# Patient Record
Sex: MALE | Race: WHITE | NOT HISPANIC OR LATINO | Employment: STUDENT | URBAN - NONMETROPOLITAN AREA
[De-identification: names, ages, dates, MRNs, and addresses within clinical notes are randomized per-mention and may not be internally consistent; named-entity substitution may affect disease eponyms.]

---
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
## 2020-02-19 ENCOUNTER — TRANSCRIBE ORDERS (OUTPATIENT)
Dept: LAB | Facility: HOSPITAL | Age: 20
End: 2020-02-19

## 2020-02-19 ENCOUNTER — LAB (OUTPATIENT)
Dept: LAB | Facility: HOSPITAL | Age: 20
End: 2020-02-19

## 2020-02-19 DIAGNOSIS — R56.9 SEIZURE (HCC): Primary | ICD-10-CM

## 2020-02-19 DIAGNOSIS — R56.9 SEIZURE (HCC): ICD-10-CM

## 2020-02-19 PROCEDURE — 80183 DRUG SCRN QUANT OXCARBAZEPIN: CPT | Performed by: PSYCHIATRY & NEUROLOGY

## 2020-02-24 LAB — OXCARBAZEPINE: 21 UG/ML (ref 10–35)

## 2020-03-18 RX ORDER — OXCARBAZEPINE 300 MG/1
450 TABLET, FILM COATED ORAL 2 TIMES DAILY
Qty: 270 TABLET | Refills: 1 | Status: SHIPPED | OUTPATIENT
Start: 2020-03-18 | End: 2022-04-20

## 2020-04-03 ENCOUNTER — OFFICE VISIT (OUTPATIENT)
Dept: FAMILY MEDICINE CLINIC | Facility: CLINIC | Age: 20
End: 2020-04-03

## 2020-04-03 VITALS
BODY MASS INDEX: 20.64 KG/M2 | TEMPERATURE: 97.4 F | WEIGHT: 147.4 LBS | DIASTOLIC BLOOD PRESSURE: 60 MMHG | OXYGEN SATURATION: 98 % | SYSTOLIC BLOOD PRESSURE: 100 MMHG | HEIGHT: 71 IN | HEART RATE: 65 BPM

## 2020-04-03 DIAGNOSIS — S09.93XA FACIAL INJURY, INITIAL ENCOUNTER: Primary | ICD-10-CM

## 2020-04-03 PROCEDURE — 99213 OFFICE O/P EST LOW 20 MIN: CPT | Performed by: FAMILY MEDICINE

## 2020-04-03 NOTE — PROGRESS NOTES
" Subjective   Abundiolaurita Smith is a 19 y.o. male.     History of Present Illness     Fell.  He is not sure if lost consciousness.  His legs gave out.  He cant remember anything more.  He has history of seizure like activity.  Same date last year, seizure like activity and broke his nose.  He fell again on his face and has contusion to nose also abrasions on face including nose. Dr quevedo is his neurologist.     Review of Systems   Constitutional: Negative for chills, fatigue and fever.   HENT: Negative for congestion, ear discharge, ear pain, facial swelling, hearing loss, postnasal drip, rhinorrhea, sinus pressure, sore throat, trouble swallowing and voice change.    Eyes: Negative for discharge, redness and visual disturbance.   Respiratory: Negative for cough, chest tightness, shortness of breath and wheezing.    Cardiovascular: Negative for chest pain and palpitations.   Gastrointestinal: Negative for abdominal pain, blood in stool, constipation, diarrhea, nausea and vomiting.   Endocrine: Negative for polydipsia and polyuria.   Genitourinary: Negative for dysuria, flank pain, hematuria and urgency.   Musculoskeletal: Negative for arthralgias, back pain, joint swelling and myalgias.   Skin: Positive for wound. Negative for rash.   Neurological: Negative for dizziness, weakness, numbness and headaches.   Hematological: Negative for adenopathy.   Psychiatric/Behavioral: Negative for confusion and sleep disturbance. The patient is not nervous/anxious.            /60 (BP Location: Left arm, Patient Position: Sitting, Cuff Size: Adult)   Pulse 65   Temp 97.4 °F (36.3 °C) (Temporal)   Ht 180.3 cm (70.98\")   Wt 66.9 kg (147 lb 6.4 oz)   SpO2 98%   BMI 20.57 kg/m²       Objective     Physical Exam   Constitutional: He is oriented to person, place, and time. He appears well-developed and well-nourished.   HENT:   Head: Normocephalic.   Right Ear: External ear normal.   Left Ear: External ear normal.   Nose: " Nose normal.   Contusion and abrasion bridge of nose and forehead.    Eyes: Pupils are equal, round, and reactive to light. Conjunctivae and EOM are normal.   Neck: Normal range of motion.   Pulmonary/Chest: Effort normal.   Musculoskeletal: Normal range of motion.   Neurological: He is alert and oriented to person, place, and time.   No deficit noted   Skin:   Abrasion right anterior knee.    Psychiatric: He has a normal mood and affect. His behavior is normal. Judgment and thought content normal.   Nursing note and vitals reviewed.          PAST MEDICAL HISTORY     Past Medical History:   Diagnosis Date   • Acute otitis media    • Backache    • Bradycardia    • Common cold    • Cough    • Dyspnea    • Fever    • Headache    • Hip pain    • Joint pain of ankle and foot    • Memory impairment     apparent, not definitive      • S/P ventriculoperitoneal shunt    • Sprain of ankle    • Well child visit     immunization update         PAST SURGICAL HISTORY   No past surgical history on file.   SOCIAL HISTORY     Social History     Socioeconomic History   • Marital status: Single     Spouse name: Not on file   • Number of children: Not on file   • Years of education: Not on file   • Highest education level: Not on file   Tobacco Use   • Smoking status: Never Smoker   • Smokeless tobacco: Never Used   Substance and Sexual Activity   • Alcohol use: No   • Drug use: No   • Sexual activity: Defer      ALLERGIES   Patient has no known allergies.   MEDICATIONS     Current Outpatient Medications   Medication Sig Dispense Refill   • OXcarbazepine (TRILEPTAL) 300 MG tablet Take 1.5 tablets by mouth 2 (Two) Times a Day. 270 tablet 1   • promethazine (PHENERGAN) 25 MG tablet Take 1 tablet by mouth Every 6 (Six) Hours As Needed for Nausea or Vomiting. 30 tablet 0   • fluticasone (FLONASE) 50 MCG/ACT nasal spray 2 sprays into each nostril Daily. 1 bottle 11     No current facility-administered medications for this visit.         The  following portions of the patient's history were reviewed and updated as appropriate: allergies, current medications, past family history, past medical history, past social history, past surgical history and problem list.        Assessment/Plan   Abundio was seen today for facial injury.    Diagnoses and all orders for this visit:    Facial injury, initial encounter  -     XR Nasal Bones (In Office)      I don't see fracture.  May have had another seizure like episode.  He forgot his medicine Monday but I would not expect that to matter today.     We cleaned the blood off his face                  No follow-ups on file.                  This document has been electronically signed by Tiburcio Justin MD on April 3, 2020 15:18

## 2020-04-28 RX ORDER — CEPHALEXIN 250 MG/1
250 CAPSULE ORAL 4 TIMES DAILY
Qty: 28 CAPSULE | Refills: 0 | Status: SHIPPED | OUTPATIENT
Start: 2020-04-28 | End: 2022-04-20

## 2020-06-17 ENCOUNTER — LAB (OUTPATIENT)
Dept: LAB | Facility: HOSPITAL | Age: 20
End: 2020-06-17

## 2020-06-17 ENCOUNTER — TRANSCRIBE ORDERS (OUTPATIENT)
Dept: LAB | Facility: HOSPITAL | Age: 20
End: 2020-06-17

## 2020-06-17 DIAGNOSIS — R56.9 SEIZURES (HCC): ICD-10-CM

## 2020-06-17 DIAGNOSIS — Z79.899 ENCOUNTER FOR LONG-TERM (CURRENT) USE OF OTHER MEDICATIONS: Primary | ICD-10-CM

## 2020-06-17 LAB
ANION GAP SERPL CALCULATED.3IONS-SCNC: 10.3 MMOL/L (ref 5–15)
BUN BLD-MCNC: 11 MG/DL (ref 6–20)
BUN/CREAT SERPL: 14.1 (ref 7–25)
CALCIUM SPEC-SCNC: 9.4 MG/DL (ref 8.6–10.5)
CHLORIDE SERPL-SCNC: 105 MMOL/L (ref 98–107)
CO2 SERPL-SCNC: 26.7 MMOL/L (ref 22–29)
CREAT BLD-MCNC: 0.78 MG/DL (ref 0.76–1.27)
GFR SERPL CREATININE-BSD FRML MDRD: 127 ML/MIN/1.73
GLUCOSE BLD-MCNC: 93 MG/DL (ref 65–99)
POTASSIUM BLD-SCNC: 4.2 MMOL/L (ref 3.5–5.2)
SODIUM BLD-SCNC: 142 MMOL/L (ref 136–145)

## 2020-06-17 PROCEDURE — 80048 BASIC METABOLIC PNL TOTAL CA: CPT | Performed by: PSYCHIATRY & NEUROLOGY

## 2020-06-17 PROCEDURE — 80183 DRUG SCRN QUANT OXCARBAZEPIN: CPT | Performed by: PSYCHIATRY & NEUROLOGY

## 2020-06-19 LAB — OXCARBAZEPINE: 23 UG/ML (ref 10–35)

## 2022-04-09 DIAGNOSIS — R00.1 BRADYCARDIA: Primary | ICD-10-CM

## 2022-04-20 ENCOUNTER — OFFICE VISIT (OUTPATIENT)
Dept: CARDIOLOGY | Facility: CLINIC | Age: 22
End: 2022-04-20

## 2022-04-20 ENCOUNTER — CLINICAL SUPPORT (OUTPATIENT)
Dept: CARDIOLOGY | Facility: CLINIC | Age: 22
End: 2022-04-20

## 2022-04-20 VITALS
BODY MASS INDEX: 24.11 KG/M2 | HEART RATE: 64 BPM | SYSTOLIC BLOOD PRESSURE: 122 MMHG | OXYGEN SATURATION: 96 % | WEIGHT: 172.2 LBS | DIASTOLIC BLOOD PRESSURE: 84 MMHG | HEIGHT: 71 IN

## 2022-04-20 DIAGNOSIS — R00.1 BRADYCARDIA, SINUS: ICD-10-CM

## 2022-04-20 DIAGNOSIS — I45.5 SINUS PAUSE: ICD-10-CM

## 2022-04-20 DIAGNOSIS — R00.1 SLOW HEART RATE: ICD-10-CM

## 2022-04-20 DIAGNOSIS — R55 SYNCOPE AND COLLAPSE: Primary | ICD-10-CM

## 2022-04-20 PROCEDURE — 99204 OFFICE O/P NEW MOD 45 MIN: CPT | Performed by: NURSE PRACTITIONER

## 2022-04-20 PROCEDURE — 93000 ELECTROCARDIOGRAM COMPLETE: CPT | Performed by: INTERNAL MEDICINE

## 2022-04-20 PROCEDURE — 93010 ELECTROCARDIOGRAM REPORT: CPT | Performed by: INTERNAL MEDICINE

## 2022-04-20 PROCEDURE — 93285 PRGRMG DEV EVAL SCRMS IP: CPT | Performed by: NURSE PRACTITIONER

## 2022-04-20 RX ORDER — LACOSAMIDE 100 MG/1
TABLET ORAL
COMMUNITY
End: 2022-06-01

## 2022-04-20 NOTE — PROGRESS NOTES
"Slow Heart Rate (Chief Complaint )      History of Present Illness     Mr. Leonel Smith is a 22-year-old  male with complex medical history including chronic  shunt, seizures, frontal lobe syndrome, hydrocephalus with revision in 2016 disease (he follows with neurology, Dr. Kraus at [facility]), PDA closure and Pectus Excavatum \"sunken in chest\".  Patient presents today with mother who assist with his memory and history due to forgetfulness with frontal lobe syndrome. He has chronic known bradycardia. He previously was noted to follow with pediatric cardiologist in [facility]. Last seen there in Jan 2021.     He presents to our clinic today for consultation regarding bradycardia and recurrent syncope. Patient reports syncope x several weeks. He reports nystagmus and visual changes prior to syncope. He denies cardiac symptoms such as palpitations, shortness of breath, or other prodromal symptoms. Episodes have been witnessed.  Patient will awaken after a few seconds to minutes.  He has no recollection of what happened and feels very sluggish afterwards. Denies syncope being related to seizure like activity, convulsion, loss or bowel or bladder. He is on seizure medication Vimpat, however syncope was occurring prior to starting of this medication.     He most recently was evaluated by Dr. Kim.  Echocardiogram in his office was normal.  Tilt table was normal. Loop recorder was placed.  Patient has been noted to have episodes of sinus pauses per Dr. Kim's office note and he has noted possible permament pacemaker may be required.            Past Medical History:   Diagnosis Date   • Acute otitis media    • Backache    • Bradycardia    • Common cold    • Cough    • Dyspnea    • Fever    • Headache    • Hip pain    • Joint pain of ankle and foot    • Memory impairment     apparent, not definitive      • S/P ventriculoperitoneal shunt    • Sprain of ankle    • Well child visit     immunization update  "       History reviewed. No pertinent surgical history.  Social History     Socioeconomic History   • Marital status: Single   Tobacco Use   • Smoking status: Never Smoker   • Smokeless tobacco: Never Used   Substance and Sexual Activity   • Alcohol use: No   • Drug use: No   • Sexual activity: Defer     History reviewed. No pertinent family history.    ALLERGIES:  No Known Allergies      Review of Systems   Constitutional: Positive for malaise/fatigue. Negative for chills, fever and weight gain.   HENT: Negative for nosebleeds and tinnitus.    Eyes: Negative for blurred vision and double vision.   Cardiovascular: Positive for chest pain and syncope. Negative for dyspnea on exertion, irregular heartbeat, leg swelling and palpitations.   Respiratory: Negative for cough, shortness of breath, sleep disturbances due to breathing and snoring.    Endocrine: Negative for polydipsia, polyphagia and polyuria.   Hematologic/Lymphatic: Negative for bleeding problem. Does not bruise/bleed easily.   Skin: Negative for color change and suspicious lesions.   Musculoskeletal: Negative for falls and myalgias.   Gastrointestinal: Negative for bloating, heartburn and hematochezia.   Genitourinary: Negative for dysuria and hematuria.   Neurological: Positive for seizures. Negative for dizziness, headaches, vertigo and weakness.   Psychiatric/Behavioral: Negative for altered mental status and depression. The patient does not have insomnia and is not nervous/anxious.    Allergic/Immunologic: Negative for environmental allergies and persistent infections.       Current Outpatient Medications   Medication Sig Dispense Refill   • lacosamide (VIMPAT) 100 MG tablet tablet Take  by mouth.       No current facility-administered medications for this visit.       OBJECTIVE:    Physical Exam:   Vitals reviewed.   Constitutional:       General: Not in acute distress.     Appearance: Normal appearance. Well-developed. Not toxic-appearing or  "diaphoretic.   Eyes:      General: Lids are normal.      Conjunctiva/sclera: Conjunctivae normal.   HENT:      Head: Normocephalic and atraumatic.      Right Ear: External ear normal.      Left Ear: External ear normal.   Neck:      Vascular: No JVD.   Pulmonary:      Effort: Pulmonary effort is normal. No respiratory distress.      Breath sounds: Normal breath sounds. No decreased breath sounds. No wheezing. No rales.   Chest:      Chest wall: Not tender to palpatation.   Cardiovascular:      PMI at left midclavicular line. Normal rate. Regular rhythm. Normal S1 with normal intensity. Normal S2 with normal intensity.      Murmurs: There is no murmur.      No gallop. No S3 and S4 gallop. No click. No rub.   Pulses:     Intact distal pulses. No decreased pulses.   Edema:     Peripheral edema absent.   Abdominal:      General: Bowel sounds are normal. There is no distension.      Palpations: Abdomen is soft.      Tenderness: There is no abdominal tenderness.   Musculoskeletal:      Cervical back: Normal range of motion and neck supple. Skin:     General: Skin is warm and dry.      Coloration: Skin is not pale.      Findings: No erythema or rash.   Neurological:      Mental Status: Alert and oriented to person, place, and time.      Gait: Gait normal.   Psychiatric:         Behavior: Behavior normal.         Thought Content: Thought content normal.         Judgment: Judgment normal.       Vitals:    04/20/22 1520   BP: 122/84   BP Location: Right arm   Patient Position: Sitting   Cuff Size: Adult   Pulse: 64   SpO2: 96%   Weight: 78.1 kg (172 lb 3.2 oz)   Height: 180.3 cm (71\")       DATA REVIEWED:       No radiology results for the last 30 days.    Labs: BMP, CBC, LIPID, TSH  Lab Results   Component Value Date    GLUCOSE 93 06/17/2020    CALCIUM 10.3 10/06/2021     10/06/2021    K 4.3 10/06/2021    CO2 28 10/06/2021     10/06/2021    BUN 11 10/06/2021    CREATININE 0.83 10/06/2021    EGFRIFNONA 127 " 06/17/2020    BCR 14.1 06/17/2020    ANIONGAP 12 10/06/2021     Lab Results   Component Value Date    WBC 6.82 11/18/2019    HGB 17.1 11/18/2019    HCT 48.0 11/18/2019    MCV 87.1 11/18/2019     11/18/2019     No results found for: CHOL  No results found for: TRIG  No results found for: HDL  No components found for: LDLCALC  No results found for: LDL  No results found for: HDLLDLRATIO  No components found for: CHOLHDL  Lab Results   Component Value Date    TSH 2.300 08/05/2019     No results found for: PROBNP  EKG:         TTE:         The following portions of the patient's history were reviewed and updated as appropriate: allergies, current medications, past family history, past medical history, past social history, past surgical history and problem list.  Old records reviewed and pertinent information is included in the above objective data.     ASSESSMENT/PLAN:       Diagnosis Plan   1. Syncope and collapse  Adult Transthoracic Echo Complete w/ Color, Spectral and Contrast if Necessary Per Protocol   2. Sinus pause  Adult Transthoracic Echo Complete w/ Color, Spectral and Contrast if Necessary Per Protocol   3. Bradycardia, sinus  Adult Transthoracic Echo Complete w/ Color, Spectral and Contrast if Necessary Per Protocol       This is a 22-year-old  male as discussed above with complex medical history who previously followed with pediatric cardiology at [place] and most recently with Dr. Kim. Patient and mother were unhappy with their care and are transitioning to our office.  He has been noted to have recurrent syncope.  Concerns for cardiac etiology.  He does have a history of seizures, which are chronic. Syncope has developed prior to Vimpat. Vimpat does carry the potential side effect of cardiac conduction delay, however doubt this as a cause and syncope was occurring prior to medication.   -Echo at Dr. Kim's office unremarkable  -Tilt table at Dr. Kim's office  negative  -Patient ambulated in office today and HR did increase appropriately.   -EKG NSR at 64 bpm  -Loop recorder in place and interrogated showing: no AT/AF. Multiple bradycardia episodes ranging from 38-41 bpm. No heart block or pauses noted since last interrogation. We will repeat TTE at our office to assess for structural HD. Will consider Cardiac MRI. Case and loop recorder episodes to be discussed with Dr. Morales, electrophysiologist. No urgent or definitive indication for PPM at this time. Our office will watch for further events on loop recorder to be transmitted.     Follow up: 1 month    I spent 45 minutes caring for Abundio on this date of service. This time includes time spent by me in the following activities: preparing for the visit, reviewing tests, obtaining and/or reviewing a separately obtained history, performing a medically appropriate examination and/or evaluation, counseling and educating the patient/family/caregiver, ordering medications, tests, or procedures, referring and communicating with other health care professionals, documenting information in the medical record, independently interpreting results and communicating that information with the patient/family/caregiver and care coordination              This document has been electronically signed by JOANA Peñaloza on April 26, 2022 16:54 CDT

## 2022-04-21 LAB
QT INTERVAL: 370 MS
QTC INTERVAL: 381 MS

## 2022-04-21 NOTE — PROGRESS NOTES
Loop Recorder 30 day Event Summary, OFFICE     Indications:   Syncope         Loop recorder interrogation shows 0 episodes of atrial fibrillation     COUNTER since 4/8/22  Symptoms: 0  Tachy: 1  Pause: 1  Gerardo: 424  AT: 0  AF: 0  % of time in AF: 0     Observation Summary:  Noise ( % of day) 0    1. Syncope and collapse    2. Sinus pause    3. Bradycardia, sinus                  This document has been electronically signed by JOANA Peñaloza on April 26, 2022 16:59 CDT

## 2022-04-25 ENCOUNTER — TELEPHONE (OUTPATIENT)
Dept: CARDIOLOGY | Facility: CLINIC | Age: 22
End: 2022-04-25

## 2022-04-26 PROBLEM — R00.1 BRADYCARDIA, SINUS: Status: ACTIVE | Noted: 2022-04-26

## 2022-04-26 PROBLEM — R55 SYNCOPE AND COLLAPSE: Status: ACTIVE | Noted: 2022-04-26

## 2022-04-26 PROBLEM — I45.5 SINUS PAUSE: Status: ACTIVE | Noted: 2022-04-26

## 2022-05-04 ENCOUNTER — OFFICE VISIT (OUTPATIENT)
Dept: CARDIOLOGY | Facility: CLINIC | Age: 22
End: 2022-05-04

## 2022-05-04 VITALS
HEART RATE: 55 BPM | HEIGHT: 71 IN | TEMPERATURE: 97.1 F | BODY MASS INDEX: 24.78 KG/M2 | WEIGHT: 177 LBS | DIASTOLIC BLOOD PRESSURE: 70 MMHG | OXYGEN SATURATION: 98 % | SYSTOLIC BLOOD PRESSURE: 116 MMHG

## 2022-05-04 DIAGNOSIS — R00.1 BRADYCARDIA, SINUS: ICD-10-CM

## 2022-05-04 DIAGNOSIS — R55 SYNCOPE AND COLLAPSE: Primary | ICD-10-CM

## 2022-05-04 DIAGNOSIS — I45.5 SINUS PAUSE: ICD-10-CM

## 2022-05-04 PROCEDURE — 99214 OFFICE O/P EST MOD 30 MIN: CPT | Performed by: NURSE PRACTITIONER

## 2022-05-04 NOTE — PROGRESS NOTES
"syncope  (Chief Complaint )      History of Present Illness     Mr. Leonel Smith is a 22-year-old  male with complex medical history including chronic  shunt, seizures, frontal lobe syndrome, hydrocephalus with revision in 2016 disease (he follows with neurology, Dr. Kraus at [facility]), PDA closure and Pectus Excavatum \"sunken in chest\".  Patient presents today with mother who assist with his memory and history due to forgetfulness with frontal lobe syndrome. He has chronic known bradycardia. He previously was noted to follow with pediatric cardiologist in [facility]. Last seen there in Jan 2021.     He presented to our clinic today for consultation regarding bradycardia and recurrent syncope. Patient reports syncope x several months now. He reports nystagmus and visual changes prior to syncope. He denies cardiac symptoms such as palpitations, shortness of breath, or other prodromal symptoms. Episodes have been witnessed.  Patient will awaken after a few seconds to minutes.  He has no recollection of what happened and feels very sluggish afterwards. Denies syncope being related to known seizure like activity, convulsion, loss or bowel or bladder. He is on seizure medication Vimpat.    He most recently was evaluated by Dr. Kim.  Echocardiogram in his office was normal. Tilt table was normal. Loop recorder was placed.  Patient has been noted to have episodes of sinus pauses per Dr. Kim's office note and he has noted possible permament pacemaker may be required.     5/4/22: Patient presents as a follow up today. Echocardiogram was repeated in our office showing normal biventricular function and no evidence of structural HD. Loop recorder showing no further pauses. He does have bradycardia events which mostly occur around 0700-10am. Last syncopal episode in early March. He does report continued fatigue. The syncopal episodes are interfering with his life and making him unable to drive, work and " perform his normal activities.         Past Medical History:   Diagnosis Date   • Acute otitis media    • Backache    • Bradycardia    • Common cold    • Cough    • Dyspnea    • Fever    • Headache    • Hip pain    • Joint pain of ankle and foot    • Memory impairment     apparent, not definitive      • S/P ventriculoperitoneal shunt    • Sprain of ankle    • Well child visit     immunization update        History reviewed. No pertinent surgical history.  Social History     Socioeconomic History   • Marital status: Single   Tobacco Use   • Smoking status: Never Smoker   • Smokeless tobacco: Never Used   Substance and Sexual Activity   • Alcohol use: No   • Drug use: No   • Sexual activity: Defer     History reviewed. No pertinent family history.    ALLERGIES:  No Known Allergies      Review of Systems   Constitutional: Positive for malaise/fatigue. Negative for chills, fever and weight gain.   HENT: Negative for nosebleeds and tinnitus.    Eyes: Negative for blurred vision and double vision.   Cardiovascular: Positive for chest pain and syncope. Negative for dyspnea on exertion, irregular heartbeat, leg swelling and palpitations.   Respiratory: Negative for cough, shortness of breath, sleep disturbances due to breathing and snoring.    Endocrine: Negative for polydipsia, polyphagia and polyuria.   Hematologic/Lymphatic: Negative for bleeding problem. Does not bruise/bleed easily.   Skin: Negative for color change and suspicious lesions.   Musculoskeletal: Negative for falls and myalgias.   Gastrointestinal: Negative for bloating, heartburn and hematochezia.   Genitourinary: Negative for dysuria and hematuria.   Neurological: Positive for seizures. Negative for dizziness, headaches, vertigo and weakness.   Psychiatric/Behavioral: Negative for altered mental status and depression. The patient does not have insomnia and is not nervous/anxious.    Allergic/Immunologic: Negative for environmental allergies and persistent  infections.       Current Outpatient Medications   Medication Sig Dispense Refill   • lacosamide (VIMPAT) 100 MG tablet tablet Take  by mouth.       No current facility-administered medications for this visit.       OBJECTIVE:    Physical Exam:   Vitals reviewed.   Constitutional:       General: Not in acute distress.     Appearance: Normal appearance. Well-developed. Not toxic-appearing or diaphoretic.   Eyes:      General: Lids are normal.      Conjunctiva/sclera: Conjunctivae normal.   HENT:      Head: Normocephalic and atraumatic.      Right Ear: External ear normal.      Left Ear: External ear normal.   Neck:      Vascular: No JVD.   Pulmonary:      Effort: Pulmonary effort is normal. No respiratory distress.      Breath sounds: Normal breath sounds. No decreased breath sounds. No wheezing. No rales.   Chest:      Chest wall: Not tender to palpatation.   Cardiovascular:      PMI at left midclavicular line. Normal rate. Regular rhythm. Normal S1 with normal intensity. Normal S2 with normal intensity.      Murmurs: There is no murmur.      No gallop. No S3 and S4 gallop. No click. No rub.   Pulses:     Intact distal pulses. No decreased pulses.   Edema:     Peripheral edema absent.   Abdominal:      General: Bowel sounds are normal. There is no distension.      Palpations: Abdomen is soft.      Tenderness: There is no abdominal tenderness.   Musculoskeletal:      Cervical back: Normal range of motion and neck supple. Skin:     General: Skin is warm and dry.      Coloration: Skin is not pale.      Findings: No erythema or rash.   Neurological:      Mental Status: Alert and oriented to person, place, and time.      Gait: Gait normal.   Psychiatric:         Behavior: Behavior normal.         Thought Content: Thought content normal.         Judgment: Judgment normal.       Vitals:    05/04/22 1447   BP: 116/70   BP Location: Left arm   Patient Position: Sitting   Cuff Size: Adult   Pulse: 55   Temp: 97.1 °F (36.2 °C)  "  SpO2: 98%   Weight: 80.3 kg (177 lb)   Height: 180.3 cm (71\")       DATA REVIEWED:   Results for orders placed in visit on 04/27/22    Adult Transthoracic Echo Complete w/ Color, Spectral and Contrast if Necessary Per Protocol    Interpretation Summary  · Left ventricular diastolic function was normal.  · ENDOCARDIUM IS NOT WELL SEEN.      No radiology results for the last 30 days.    Labs: BMP, CBC, LIPID, TSH  Lab Results   Component Value Date    GLUCOSE 93 06/17/2020    CALCIUM 10.3 10/06/2021     10/06/2021    K 4.3 10/06/2021    CO2 28 10/06/2021     10/06/2021    BUN 11 10/06/2021    CREATININE 0.83 10/06/2021    EGFRIFNONA 127 06/17/2020    BCR 14.1 06/17/2020    ANIONGAP 12 10/06/2021     Lab Results   Component Value Date    WBC 6.82 11/18/2019    HGB 17.1 11/18/2019    HCT 48.0 11/18/2019    MCV 87.1 11/18/2019     11/18/2019     No results found for: CHOL  No results found for: TRIG  No results found for: HDL  No components found for: LDLCALC  No results found for: LDL  No results found for: HDLLDLRATIO  No components found for: CHOLHDL  Lab Results   Component Value Date    TSH 2.300 08/05/2019     No results found for: PROBNP  EKG:         TTE:     4/27/22:    Left Ventricle Calculated left ventricular EF = 56.2%   Normal left ventricular cavity size and wall thickness noted. All left ventricular wall segments contract normally. Left ventricular diastolic function was normal. ENDOCARDIUM IS NOT WELL SEEN   Right Ventricle Normal right ventricular cavity size, wall thickness, systolic function and septal motion noted.   Left Atrium Normal left atrial size and volume noted.   Right Atrium Normal right atrial cavity size noted.   Aortic Valve The aortic valve is structurally normal with no regurgitation or stenosis present.   Mitral Valve The mitral valve is structurally normal with no regurgitation or significant stenosis present.   Tricuspid Valve The tricuspid valve is structurally " normal with no significant stenosis present. Physiologic tricuspid valve regurgitation is present.   Pulmonic Valve The pulmonic valve is structurally normal with no regurgitation or significant stenosis present.   Greater Vessels No dilation of the aortic root is present.   Pericardium The pericardium is normal. There is no evidence of pericardial effusion. .             The following portions of the patient's history were reviewed and updated as appropriate: allergies, current medications, past family history, past medical history, past social history, past surgical history and problem list.  Old records reviewed and pertinent information is included in the above objective data.     ASSESSMENT/PLAN:    1. Syncope and collapse    2. Sinus pause    3. Bradycardia, sinus      This is a 22-year-old  male as discussed above with complex medical history who previously followed with pediatric cardiology at [place] and most recently with Dr. Kim. Patient and mother were unhappy with their care and are transitioning to our office.  He has been noted to have recurrent syncope.  Concerns for cardiac etiology.  He does have a history of seizures, which are chronic.  Vimpat does carry the potential side effect of cardiac conduction delay, brugada syndrome etc. For now we have advised that neurologist consider possibly changing seizure medication to see if this is medication is potentially contributing to his symptoms.     -Echo  unremarkable  -Tilt table at Dr. Kim's office negative  -Patient ambulated in office today and HR did increase appropriately.   -EKG NSR at 64 bpm  -Loop recorder in place and interrogated showing: no AT/AF. Multiple bradycardia episodes ranging from 38-41 bpm. No heart block or pauses noted since last interrogation.  Case and loop recorder episodes to be discussed with Dr. Morales, electrophysiologist. No urgent or definitive indication for PPM at this time. Our office will watch  for further events on loop recorder to be transmitted.     Follow up: Dr. Morales in 4-6 weeks. Appreciate his opinion and assistance with this very pleasant family.     I spent 30 minutes caring for Abundio on this date of service. This time includes time spent by me in the following activities: preparing for the visit, reviewing tests, obtaining and/or reviewing a separately obtained history, performing a medically appropriate examination and/or evaluation, counseling and educating the patient/family/caregiver, ordering medications, tests, or procedures, referring and communicating with other health care professionals, documenting information in the medical record, independently interpreting results and communicating that information with the patient/family/caregiver and care coordination                This document has been electronically signed by JOANA Peñaloza on May 5, 2022 12:45 CDT

## 2022-05-13 ENCOUNTER — TELEPHONE (OUTPATIENT)
Dept: CARDIOLOGY | Facility: CLINIC | Age: 22
End: 2022-05-13

## 2022-05-13 NOTE — TELEPHONE ENCOUNTER
"Patients Mom had called in stating Abundio had an episode of \" both hands tingling, eyes shaking, and feeling like he's going 100 miles per hour\". I told Mom I looked at the ICM and no remotes had came in today. Asked her to have Abundio send remote now so we can see it. Mom states she tells Abundio to send remote when he's having episodes but he has memory problems and forgets to send. Mom to send remote. Will continue to monitor.  "

## 2022-05-26 ENCOUNTER — TELEPHONE (OUTPATIENT)
Dept: CARDIOLOGY | Facility: CLINIC | Age: 22
End: 2022-05-26

## 2022-05-26 NOTE — TELEPHONE ENCOUNTER
Patients mother telephoned regarding ICM remotes. Told Mom patient hasn't had any alert to come in since 5/12/22, since I talked to her last. She did say his neurologist has made medications changes and that patient would be off the Vimpat completely in 2 days. Will continue to monitor.

## 2022-05-31 ENCOUNTER — TELEPHONE (OUTPATIENT)
Dept: CARDIOLOGY | Facility: CLINIC | Age: 22
End: 2022-05-31

## 2022-05-31 NOTE — TELEPHONE ENCOUNTER
Contacted patient mother and informed her that we can move his appointment up to 6/3/2022 @ 0900 to discuss surgical clearance.     She voiced her understanding and said that they would be there.  ----- Message from JOANA Peñaloza sent at 5/29/2022  2:50 PM CDT -----  Regarding: FW: Abundio Smith  Can Carmen take care of this, he has an upcoming appt with him. I see no reason he cannot proceed. Can we push up Akram appt and possibly call Leonel's mother?     Thanks I appreciate your help with this        ----- Message -----  From: Abundio Smith  Sent: 5/26/2022   1:26 PM CDT  To: JOANA Peñaloza  Subject: Abundio Duffyes                                      Who do I need to speak to for cardiac clearance, he is feeling really tired and bad every day and is consistently having bad headaches almost daily. I feel like this is his shunt and need to be replaced sooner than we had expected. Not sure why to do Neuro surgeon wanted to wait and see if he needed a pacemaker or what was gonna happen with all that.  But I feel like every day he is feeling worse.

## 2022-06-01 ENCOUNTER — OFFICE VISIT (OUTPATIENT)
Dept: CARDIOLOGY | Facility: CLINIC | Age: 22
End: 2022-06-01

## 2022-06-01 VITALS
WEIGHT: 173 LBS | SYSTOLIC BLOOD PRESSURE: 108 MMHG | BODY MASS INDEX: 23.43 KG/M2 | HEART RATE: 47 BPM | HEIGHT: 72 IN | DIASTOLIC BLOOD PRESSURE: 60 MMHG | OXYGEN SATURATION: 97 %

## 2022-06-01 DIAGNOSIS — R00.1 BRADYCARDIA, SINUS: ICD-10-CM

## 2022-06-01 DIAGNOSIS — R55 SYNCOPE AND COLLAPSE: ICD-10-CM

## 2022-06-01 DIAGNOSIS — I45.5 SINUS PAUSE: Primary | ICD-10-CM

## 2022-06-01 PROCEDURE — 99215 OFFICE O/P EST HI 40 MIN: CPT | Performed by: INTERNAL MEDICINE

## 2022-06-01 PROCEDURE — 93000 ELECTROCARDIOGRAM COMPLETE: CPT | Performed by: INTERNAL MEDICINE

## 2022-06-01 RX ORDER — TOPIRAMATE 100 MG/1
100 TABLET, FILM COATED ORAL 2 TIMES DAILY
COMMUNITY

## 2022-06-01 NOTE — PROGRESS NOTES
"Abundio Smith  22 y.o. male    06/01/2022  1. Sinus pause    2. Syncope and collapse        History of Present Illness:    22 years old patient with complex medical history and recurrent syncope with undefined mechanism s/p intracardiac lead placement by Dr. Kim and noted multiple pauses of and significant bradyarrhythmia with associated symptom of dizziness and near syncope after pacemaker but never materialized recently evaluated at our office by Amanda Meyer.  Intracardiac loop was interrogated no significant bradyarrhythmia and no pause was noted.  Patient with significant problems with fatigue lightheaded dizziness and syncopal episode.  Patient with normal cardiac status.  Syncope could be multifactorial etiology due to possibility of vasovagal or orthostasis cannot be excluded from clinical descriptions.   medical history including chronic  shunt, seizures, frontal lobe syndrome, hydrocephalus with revision in 2016 disease (he follows with neurology, Dr. Kraus at [facility]), PDA closure and Pectus Excavatum \"sunken in chest\".  Patient presents today with mother who assist with his memory and history due to forgetfulness with frontal lobe syndrome. He has chronic known bradycardia. He previously was noted to follow with pediatric cardiologist in [facility]. Last seen there in Jan 2021.    . He reports nystagmus and visual changes prior to syncope. He denies cardiac symptoms such as palpitations, shortness of breath, or other prodromal symptoms. Episodes have been witnessed.  Patient will awaken after a few seconds to minutes.  He has no recollection of what happened and feels very sluggish afterwards. Denies syncope being related to known seizure like activity, convulsion, loss or bowel or bladder.      He most recently was evaluated by Dr. Kim.  Echocardiogram in his office was normal. Tilt table was normal. Loop recorder was placed.  Patient has been noted to have episodes of sinus " pauses per Dr. Kim's office note and he has noted possible permament pacemaker may be required.      5/4/22: Patient presents as a follow up today. Echocardiogram was repeated in our office showing normal biventricular function and no evidence of structural HD. Loop recorder showing no further pauses. He does have bradycardia events which mostly occur around 0700-10am. Last syncopal episode in early March. He does report continued fatigue. The syncopal episodes are interfering with his life and making him unable to drive, work and perform his normal activities.     April 2022 Echo    Left Ventricle Calculated left ventricular EF = 56.2%   Normal left ventricular cavity size and wall thickness noted. All left ventricular wall segments contract normally. Left ventricular diastolic function was normal. ENDOCARDIUM IS NOT WELL SEEN   Right Ventricle Normal right ventricular cavity size, wall thickness, systolic function and septal motion noted.   Left Atrium Normal left atrial size and volume noted         SUBJECTIVE:    No Known Allergies      Past Medical History:   Diagnosis Date   • Acute otitis media    • Backache    • Bradycardia    • Common cold    • Cough    • Dyspnea    • Fever    • Headache    • Hip pain    • Joint pain of ankle and foot    • Memory impairment     apparent, not definitive      • S/P ventriculoperitoneal shunt    • Sprain of ankle    • Well child visit     immunization update            History reviewed. No pertinent surgical history.      History reviewed. No pertinent family history.      Social History     Socioeconomic History   • Marital status: Single   Tobacco Use   • Smoking status: Never Smoker   • Smokeless tobacco: Never Used   Substance and Sexual Activity   • Alcohol use: No   • Drug use: No   • Sexual activity: Defer         Current Outpatient Medications   Medication Sig Dispense Refill   • topiramate (TOPAMAX) 100 MG tablet Take 100 mg by mouth 2 (Two) Times a Day.    "    No current facility-administered medications for this visit.           Review of Systems:     Constitutional:  Denies recent weight loss, weight gain,no change in exercise tolerance.     HENT:  Denies any hearing loss, epistaxis    Eyes: No blurring    Respiratory: No COPD    Cardiovascular: See H&P    Gastrointestinal:  Denies change in bowel habits and dyspepsia    Endocrine: Negative for cold intolerance, heat intolerance, polydipsia    Genitourinary: Negative.      Musculoskeletal: History of osteoarthritis    Skin:  Deniesrashes, or skin lesions.     Allergic/Immunologic: Negative.  Negative for environmental allergies    Neurological: History of  shunt and frontal lobe syndrome    Hematological: Denies any food allergies, seasonal allergies    Psychiatric/Behavioral: Denies any history of depression        OBJECTIVE:    /60 (BP Location: Left arm, Patient Position: Sitting, Cuff Size: Adult)   Pulse (!) 47   Ht 182.9 cm (72\")   Wt 78.5 kg (173 lb)   SpO2 97%   BMI 23.46 kg/m²     Physical Exam:     Constitutional: Cooperative, alert and oriented, well-developed, well-nourished, in no acute distress.     HENT:   Head: Normocephalic, conjunctive is a pink, thyroid is nonpalpable no carotid bruit and trachea central.     Cardiovascular: Regular rhythm, S1 and S2 normal, no S3 or S4. Apical impulse not displaced. No murmurs    Pulmonary/Chest: Chest: No chest wall tenderness no rales and wheezing    Abdominal: Abdomen soft, bowel sounds normoactive, no masses,    Musculoskeletal: No deformities, clubbing, cyanosis, erythema. positive mild edema  Neurological: No gross motor or sensory deficits noted    Skin: Warm and dry to the touch, no apparent skin lesions .     Psychiatric: He has a normal mood and affect. His behavior is normal        Procedures      Lab Results   Component Value Date    WBC 6.82 11/18/2019    HGB 17.1 11/18/2019    HCT 48.0 11/18/2019    MCV 87.1 11/18/2019     " 11/18/2019     Lab Results   Component Value Date    GLUCOSE 93 06/17/2020    BUN 11 10/06/2021    CREATININE 0.83 10/06/2021    EGFRIFNONA 127 06/17/2020    BCR 14.1 06/17/2020    CO2 28 10/06/2021    CALCIUM 10.3 10/06/2021    ALBUMIN 4.80 11/18/2019    AST 30 11/18/2019    ALT 66 (H) 11/18/2019     No results found for: CHOL  No results found for: TRIG  No results found for: HDL  No components found for: LDLCALC  No results found for: LDL  No results found for: HDLLDLRATIO  No components found for: CHOLHDL  No results found for: HGBA1C  Lab Results   Component Value Date    TSH 2.300 08/05/2019           ASSESSMENT AND PLAN:  ASSESSMENT/PLAN:       #1 recurrent syncope undefined mechanism possibility cardiac arrhythmia or vasovagal syncope currently excluded    #2 symptomatic bradycardia with multiple pauses on intracardiac loop  Given the multiple pauses and bradyarrhythmia on loop recorder interrogation and previously pacemaker was offered.  I spent more than 45-minute with the patient and the family discussing the clinical condition and subsequent evaluations management.  The pacemaker was offered given the abnormal finding on intracardiac loop understanding to proceed forward.  Procedure risk pros and cons explained.  Risk included but not limited to infection, bleeding, pneumothorax, hematoma, lead dislodgment, cardiac perforation.  Risk range less than 1 to 5%.  He is a Mallampati score Pradaxa class II.  Understand willing to proceed forward.  Preferred have pacemaker middle of June this month    3 frontal lobe syndrome s/p  shunt followed neurosurgical report      History of PDA s/p surgery    I spent 45 minutes caring for Abundio on this date of service. This time includes time spent by me of counseling/coordination of care as relates to the presenting problem and any ordered procedures/tests as outlined above.               This document has been electronically signed by Alejandro Morales MD on June 1,  2022 13:32 CDT      Diagnoses and all orders for this visit:    1. Sinus pause (Primary)    2. Syncope and collapse          Alejandro Morales MD  6/1/2022  12:52 CDT

## 2022-06-02 ENCOUNTER — PREP FOR SURGERY (OUTPATIENT)
Dept: OTHER | Facility: HOSPITAL | Age: 22
End: 2022-06-02

## 2022-06-02 DIAGNOSIS — I49.5 SICK SINUS SYNDROME: Primary | ICD-10-CM

## 2022-06-02 RX ORDER — BUPIVACAINE HCL/0.9 % NACL/PF 0.1 %
2 PLASTIC BAG, INJECTION (ML) EPIDURAL ONCE
Status: CANCELLED | OUTPATIENT
Start: 2022-06-02 | End: 2022-06-02

## 2022-06-02 RX ORDER — SODIUM CHLORIDE 0.9 % (FLUSH) 0.9 %
3 SYRINGE (ML) INJECTION EVERY 12 HOURS SCHEDULED
Status: CANCELLED | OUTPATIENT
Start: 2022-06-02

## 2022-06-02 RX ORDER — SODIUM CHLORIDE 0.9 % (FLUSH) 0.9 %
10 SYRINGE (ML) INJECTION AS NEEDED
Status: CANCELLED | OUTPATIENT
Start: 2022-06-02

## 2022-06-03 ENCOUNTER — HOSPITAL ENCOUNTER (OUTPATIENT)
Facility: HOSPITAL | Age: 22
Setting detail: HOSPITAL OUTPATIENT SURGERY
End: 2022-06-03
Attending: INTERNAL MEDICINE | Admitting: INTERNAL MEDICINE

## 2022-06-03 DIAGNOSIS — I49.5 SICK SINUS SYNDROME: ICD-10-CM

## 2022-06-06 ENCOUNTER — TELEPHONE (OUTPATIENT)
Dept: CARDIOLOGY | Facility: CLINIC | Age: 22
End: 2022-06-06

## 2022-06-06 NOTE — TELEPHONE ENCOUNTER
Telephoned patients Mother back after talking with Dr. Morales. Told her pause was approx 5-6 sec pause during sleep. She asked if they should come home for pacemaker placement, I told her they could go there to local ED and have pacemaker placed there or come back here. She said she had a family member that was an ED doctor and she would call him. She also said she would call me back with decision.

## 2022-06-06 NOTE — TELEPHONE ENCOUNTER
Telephoned patient, spoke with Mother, told her patient had approx 5 sec pause on ICM 6/4/22 at 0315. Mom states patient was sleeping at that time and that patient has not c/o syncope or near syncope. Patient and family are vacationing in [place] at present time. Will discuss with Dr. Morales.

## 2022-06-07 ENCOUNTER — TELEPHONE (OUTPATIENT)
Dept: CARDIOLOGY | Facility: CLINIC | Age: 22
End: 2022-06-07

## 2022-06-07 NOTE — TELEPHONE ENCOUNTER
Mother of patient notified of >2 sec pause on ICM 6/6/22 at 0422. Mom states they are going to ED today, will keep us notified.

## 2022-06-08 LAB
QT INTERVAL: 392 MS
QTC INTERVAL: 394 MS

## 2022-06-14 ENCOUNTER — LAB (OUTPATIENT)
Dept: LAB | Facility: HOSPITAL | Age: 22
End: 2022-06-14

## 2022-07-08 ENCOUNTER — OFFICE VISIT (OUTPATIENT)
Dept: CARDIOLOGY | Facility: CLINIC | Age: 22
End: 2022-07-08

## 2022-07-08 VITALS
BODY MASS INDEX: 23.43 KG/M2 | DIASTOLIC BLOOD PRESSURE: 66 MMHG | WEIGHT: 173 LBS | HEART RATE: 96 BPM | OXYGEN SATURATION: 98 % | SYSTOLIC BLOOD PRESSURE: 116 MMHG | HEIGHT: 72 IN

## 2022-07-08 DIAGNOSIS — I45.5 SINUS PAUSE: Primary | ICD-10-CM

## 2022-07-08 DIAGNOSIS — R00.1 BRADYCARDIA, SINUS: ICD-10-CM

## 2022-07-08 PROCEDURE — 99213 OFFICE O/P EST LOW 20 MIN: CPT | Performed by: INTERNAL MEDICINE

## 2022-07-08 NOTE — PROGRESS NOTES
"Abundio Smith  22 y.o. male    7/8/2022  1. Sinus pause    2. Bradycardia, sinus        History of Present Illness:    22 years old patient to return from [facility] s/p pacemaker in early part of June 2022 with a background history of recurrent syncope s/p intracardiac loop have documented multiple long pauses he has significant provement in quality of life no further syncope or dizziness reported.  Patient is scheduled to have  shunt procedure.  He presented today site was inspected on the lateral edge there is small that was hanging out area was cleaned with Betadine and thread was removed.  No sign of infection at the time of evaluation Steri-Strips applied and he will be followed up in pacer clinic on Monday to have fresh look at the site     medical history including chronic  shunt, seizures, frontal lobe syndrome, hydrocephalus with revision in 2016 disease (he follows with neurology, Dr. Kraus at [facility]), PDA closure and Pectus Excavatum \"sunken in chest\".  Patient presents today with mother who assist with his memory and history due to forgetfulness with frontal lobe syndrome. He has chronic known bradycardia. He previously was noted to follow with pediatric cardiologist in [facility]. Last seen there in Jan 2021.    . He reports nystagmus and visual changes prior to syncope. He denies cardiac symptoms such as palpitations, shortness of breath, or other prodromal symptoms. Episodes have been witnessed.  Patient will awaken after a few seconds to minutes.  He has no recollection of what happened and feels very sluggish afterwards. Denies syncope being related to known seizure like activity, convulsion, loss or bowel or bladder.      He most recently was evaluated by Dr. Kim.  Echocardiogram in his office was normal. Tilt table was normal. Loop recorder was placed.  Patient has been noted to have episodes of sinus pauses per Dr. Kim's office note and he has noted possible " permament pacemaker may be required.      5/4/22: Patient presents as a follow up today. Echocardiogram was repeated in our office showing normal biventricular function and no evidence of structural HD. Loop recorder showing no further pauses. He does have bradycardia events which mostly occur around 0700-10am. Last syncopal episode in early March. He does report continued fatigue. The syncopal episodes are interfering with his life and making him unable to drive, work and perform his normal activities.     April 2022 Echo    Left Ventricle Calculated left ventricular EF = 56.2%   Normal left ventricular cavity size and wall thickness noted. All left ventricular wall segments contract normally. Left ventricular diastolic function was normal. ENDOCARDIUM IS NOT WELL SEEN   Right Ventricle Normal right ventricular cavity size, wall thickness, systolic function and septal motion noted.   Left Atrium Normal left atrial size and volume noted         SUBJECTIVE:    No Known Allergies      Past Medical History:   Diagnosis Date   • Acute otitis media    • Backache    • Bradycardia    • Common cold    • Cough    • Dyspnea    • Fever    • Headache    • Hip pain    • Joint pain of ankle and foot    • Memory impairment     apparent, not definitive      • S/P ventriculoperitoneal shunt    • Sprain of ankle    • Well child visit     immunization update            History reviewed. No pertinent surgical history.      History reviewed. No pertinent family history.      Social History     Socioeconomic History   • Marital status: Single   Tobacco Use   • Smoking status: Never Smoker   • Smokeless tobacco: Never Used   Substance and Sexual Activity   • Alcohol use: No   • Drug use: No   • Sexual activity: Defer         Current Outpatient Medications   Medication Sig Dispense Refill   • topiramate (TOPAMAX) 100 MG tablet Take 100 mg by mouth 2 (Two) Times a Day.       No current facility-administered medications for this visit.  "          Review of Systems:     Constitutional:  Denies recent weight loss, weight gain,no change in exercise tolerance.     HENT:  Denies any hearing loss, epistaxis    Eyes: No blurring    Respiratory: No COPD    Cardiovascular: See H&P    Gastrointestinal:  Denies change in bowel habits and dyspepsia    Endocrine: Negative for cold intolerance, heat intolerance, polydipsia    Genitourinary: Negative.      Musculoskeletal: History of osteoarthritis    Skin:  Deniesrashes, or skin lesions.     Allergic/Immunologic: Negative.  Negative for environmental allergies    Neurological: History of  shunt and frontal lobe syndrome    Hematological: Denies any food allergies, seasonal allergies    Psychiatric/Behavioral: Denies any history of depression        OBJECTIVE:    /66 (BP Location: Right arm, Patient Position: Sitting, Cuff Size: Adult)   Pulse 96   Ht 182.9 cm (72\")   Wt 78.5 kg (173 lb)   SpO2 98%   BMI 23.46 kg/m²     Physical Exam:     Constitutional: Cooperative, alert and oriented, well-developed, well-nourished, in no acute distress.     HENT:   Head: Normocephalic, conjunctive is a pink, thyroid is nonpalpable no carotid bruit and trachea central.     Cardiovascular: Regular rhythm, S1 and S2 normal, no S3 or S4. Apical impulse not displaced. No murmurs    Pulmonary/Chest: Chest: No chest wall tenderness no rales and wheezing    Abdominal: Abdomen soft, bowel sounds normoactive, no masses,    Musculoskeletal: No deformities, clubbing, cyanosis, erythema. positive mild edema  Neurological: No gross motor or sensory deficits noted    Skin: Warm and dry to the touch, no apparent skin lesions .     Psychiatric: He has a normal mood and affect. His behavior is normal        Procedures      Lab Results   Component Value Date    WBC 6.82 11/18/2019    HGB 17.1 11/18/2019    HCT 48.0 11/18/2019    MCV 87.1 11/18/2019     11/18/2019     Lab Results   Component Value Date    GLUCOSE 93 06/17/2020 "    BUN 11 10/06/2021    CREATININE 0.83 10/06/2021    EGFRIFNONA 127 06/17/2020    BCR 14.1 06/17/2020    CO2 28 10/06/2021    CALCIUM 10.3 10/06/2021    ALBUMIN 4.80 11/18/2019    AST 30 11/18/2019    ALT 66 (H) 11/18/2019     No results found for: CHOL  No results found for: TRIG  No results found for: HDL  No components found for: LDLCALC  No results found for: LDL  No results found for: HDLLDLRATIO  No components found for: CHOLHDL  No results found for: HGBA1C  Lab Results   Component Value Date    TSH 2.300 08/05/2019           ASSESSMENT AND PLAN:  ASSESSMENT/PLAN:       #1 recurrent syncope undefined mechanism with documented multiple pauses with associated symptom subsequent pacemaker at the [facility] presented post pacemaker as described above.  His follow-up was established with the pacer clinic and I will see him in 6 months    3 frontal lobe syndrome s/p  shunt followed neurosurgical report      History of PDA s/p surgery      I spent 16 minutes caring for Abundio on this date of service. This time includes time spent by me of counseling/coordination of care as relates to the presenting problem and any ordered procedures/tests as outlined above.             This document has been electronically signed by Alejandro Morales MD on July 8, 2022 11:20 CDT        Diagnoses and all orders for this visit:    1. Sinus pause (Primary)    2. Bradycardia, sinus          Alejandro Morales MD  7/8/2022  11:06 CDT

## 2022-09-28 ENCOUNTER — CLINICAL SUPPORT (OUTPATIENT)
Dept: CARDIOLOGY | Facility: CLINIC | Age: 22
End: 2022-09-28

## 2022-09-28 DIAGNOSIS — R00.1 BRADYCARDIA, SINUS: Primary | ICD-10-CM

## 2022-09-28 NOTE — PROGRESS NOTES
Pacemaker Evaluation Report    September 28, 2022    Primary Cardiologist: Dr. Morales  Implanting MD: Dr. Cohen  :[employer] Liaison Model: Assurity MRI 2272 Serial Number: [id number]  Implant date: 6/8/22    Reason for evaluation: PPM, routine, office  Cardiac device indication(s): symptomatic bradycardia    Battery  BRYAN: 9.2-10.6 years     Interrogation Results  Atrial sensing: P wave: 2.9 mV  Atrial capture: 0.5 V @ 0.5 ms   Atrial lead impedance: 450 ohms  Ventricular sensing: R wave: 9.1 mV  Ventricular capture: 0.75 V @ 0.5 ms  Ventricular lead impedance: right  590 ohms    Parameters  Mode: DDDR  Base Rate: 60/125    Diagnostic Data  Atrial paced: 74 %   Ventricular paced: <1 %  Mode switch: 0%  AT/AF [place]: 0%  AHR: 0  VHR: 0    Intrinsic Rate: 60    Changes made: A cap confirm turned on, ventricular output changed from 3.5 to 2.5 V, chronic setting.    Conclusions:  Normal device function. Follow up in one year, remote every 3 months.    Assessment:  Sick sinus syndrome with multiple pauses    Normal function pacemaker battery voltage greater than 9 years            This document has been electronically signed by Alejandro Morales MD on September 29, 2022 15:12 CDT

## 2022-09-29 PROCEDURE — 93288 INTERROG EVL PM/LDLS PM IP: CPT | Performed by: INTERNAL MEDICINE

## 2022-10-03 ENCOUNTER — TELEPHONE (OUTPATIENT)
Dept: CARDIOLOGY | Facility: CLINIC | Age: 22
End: 2022-10-03

## 2022-11-28 ENCOUNTER — HOSPITAL ENCOUNTER (EMERGENCY)
Facility: HOSPITAL | Age: 22
Discharge: HOME | End: 2022-11-28
Attending: STUDENT IN AN ORGANIZED HEALTH CARE EDUCATION/TRAINING PROGRAM | Admitting: STUDENT IN AN ORGANIZED HEALTH CARE EDUCATION/TRAINING PROGRAM

## 2022-11-28 VITALS
HEART RATE: 60 BPM | OXYGEN SATURATION: 98 % | RESPIRATION RATE: 18 BRPM | SYSTOLIC BLOOD PRESSURE: 127 MMHG | WEIGHT: 162 LBS | BODY MASS INDEX: 22.68 KG/M2 | HEIGHT: 71 IN | DIASTOLIC BLOOD PRESSURE: 84 MMHG | TEMPERATURE: 98.1 F

## 2022-11-28 DIAGNOSIS — R31.9 HEMATURIA, UNSPECIFIED TYPE: Primary | ICD-10-CM

## 2022-11-28 LAB
ANION GAP SERPL CALCULATED.3IONS-SCNC: 11 MMOL/L (ref 5–15)
BACTERIA UR QL AUTO: ABNORMAL /HPF
BASOPHILS # BLD AUTO: 0.08 10*3/MM3 (ref 0–0.2)
BASOPHILS NFR BLD AUTO: 1 % (ref 0–1.5)
BILIRUB UR QL STRIP: ABNORMAL
BUN SERPL-MCNC: 15 MG/DL (ref 6–20)
BUN/CREAT SERPL: 18.8 (ref 7–25)
CALCIUM SPEC-SCNC: 9.8 MG/DL (ref 8.6–10.5)
CHLORIDE SERPL-SCNC: 107 MMOL/L (ref 98–107)
CLARITY UR: ABNORMAL
CO2 SERPL-SCNC: 25 MMOL/L (ref 22–29)
COLOR UR: ABNORMAL
CREAT SERPL-MCNC: 0.8 MG/DL (ref 0.76–1.27)
DEPRECATED RDW RBC AUTO: 37.2 FL (ref 37–54)
EGFRCR SERPLBLD CKD-EPI 2021: 128.3 ML/MIN/1.73
EOSINOPHIL # BLD AUTO: 0.35 10*3/MM3 (ref 0–0.4)
EOSINOPHIL NFR BLD AUTO: 4.2 % (ref 0.3–6.2)
ERYTHROCYTE [DISTWIDTH] IN BLOOD BY AUTOMATED COUNT: 11.9 % (ref 12.3–15.4)
GLUCOSE SERPL-MCNC: 87 MG/DL (ref 65–99)
GLUCOSE UR STRIP-MCNC: NEGATIVE MG/DL
HCT VFR BLD AUTO: 50.1 % (ref 37.5–51)
HGB BLD-MCNC: 16.7 G/DL (ref 13–17.7)
HGB UR QL STRIP.AUTO: ABNORMAL
HYALINE CASTS UR QL AUTO: ABNORMAL /LPF
IMM GRANULOCYTES # BLD AUTO: 0.03 10*3/MM3 (ref 0–0.05)
IMM GRANULOCYTES NFR BLD AUTO: 0.4 % (ref 0–0.5)
KETONES UR QL STRIP: NEGATIVE
LEUKOCYTE ESTERASE UR QL STRIP.AUTO: ABNORMAL
LYMPHOCYTES # BLD AUTO: 2.61 10*3/MM3 (ref 0.7–3.1)
LYMPHOCYTES NFR BLD AUTO: 31.4 % (ref 19.6–45.3)
MCH RBC QN AUTO: 28.8 PG (ref 26.6–33)
MCHC RBC AUTO-ENTMCNC: 33.3 G/DL (ref 31.5–35.7)
MCV RBC AUTO: 86.5 FL (ref 79–97)
MONOCYTES # BLD AUTO: 0.91 10*3/MM3 (ref 0.1–0.9)
MONOCYTES NFR BLD AUTO: 11 % (ref 5–12)
MUCOUS THREADS URNS QL MICRO: ABNORMAL /HPF
NEUTROPHILS NFR BLD AUTO: 4.32 10*3/MM3 (ref 1.7–7)
NEUTROPHILS NFR BLD AUTO: 52 % (ref 42.7–76)
NITRITE UR QL STRIP: NEGATIVE
NRBC BLD AUTO-RTO: 0 /100 WBC (ref 0–0.2)
PH UR STRIP.AUTO: <=5 [PH] (ref 5–8)
PLATELET # BLD AUTO: 248 10*3/MM3 (ref 140–450)
PMV BLD AUTO: 8.6 FL (ref 6–12)
POTASSIUM SERPL-SCNC: 3.7 MMOL/L (ref 3.5–5.2)
PROT UR QL STRIP: ABNORMAL
RBC # BLD AUTO: 5.79 10*6/MM3 (ref 4.14–5.8)
RBC # UR STRIP: ABNORMAL /HPF
REF LAB TEST METHOD: ABNORMAL
SODIUM SERPL-SCNC: 143 MMOL/L (ref 136–145)
SP GR UR STRIP: 1.02 (ref 1–1.03)
SQUAMOUS #/AREA URNS HPF: ABNORMAL /HPF
UROBILINOGEN UR QL STRIP: ABNORMAL
WBC # UR STRIP: ABNORMAL /HPF
WBC NRBC COR # BLD: 8.3 10*3/MM3 (ref 3.4–10.8)

## 2022-11-28 PROCEDURE — 80048 BASIC METABOLIC PNL TOTAL CA: CPT | Performed by: STUDENT IN AN ORGANIZED HEALTH CARE EDUCATION/TRAINING PROGRAM

## 2022-11-28 PROCEDURE — 99283 EMERGENCY DEPT VISIT LOW MDM: CPT

## 2022-11-28 PROCEDURE — 87086 URINE CULTURE/COLONY COUNT: CPT | Performed by: PHYSICIAN ASSISTANT

## 2022-11-28 PROCEDURE — 85025 COMPLETE CBC W/AUTO DIFF WBC: CPT | Performed by: STUDENT IN AN ORGANIZED HEALTH CARE EDUCATION/TRAINING PROGRAM

## 2022-11-28 PROCEDURE — 81001 URINALYSIS AUTO W/SCOPE: CPT | Performed by: PHYSICIAN ASSISTANT

## 2022-11-28 PROCEDURE — 36415 COLL VENOUS BLD VENIPUNCTURE: CPT

## 2022-11-28 RX ORDER — KETOROLAC TROMETHAMINE 10 MG/1
10 TABLET, FILM COATED ORAL EVERY 8 HOURS PRN
Qty: 15 TABLET | Refills: 0 | Status: SHIPPED | OUTPATIENT
Start: 2022-11-28 | End: 2022-12-03

## 2022-11-30 LAB — BACTERIA SPEC AEROBE CULT: NO GROWTH

## 2022-12-01 DIAGNOSIS — R31.9 HEMATURIA, UNSPECIFIED TYPE: Primary | ICD-10-CM

## 2022-12-01 RX ORDER — PROMETHAZINE HYDROCHLORIDE 25 MG/1
25 TABLET ORAL EVERY 6 HOURS PRN
Qty: 30 TABLET | Refills: 0 | Status: SHIPPED | OUTPATIENT
Start: 2022-12-01

## 2022-12-01 RX ORDER — KETOROLAC TROMETHAMINE 30 MG/ML
60 INJECTION, SOLUTION INTRAMUSCULAR; INTRAVENOUS EVERY 8 HOURS PRN
Qty: 10 ML | Refills: 1 | Status: SHIPPED | OUTPATIENT
Start: 2022-12-01

## 2023-08-02 ENCOUNTER — APPOINTMENT (OUTPATIENT)
Dept: GENERAL RADIOLOGY | Facility: HOSPITAL | Age: 23
End: 2023-08-02
Payer: MEDICAID

## 2023-08-02 ENCOUNTER — HOSPITAL ENCOUNTER (EMERGENCY)
Facility: HOSPITAL | Age: 23
Discharge: HOME | End: 2023-08-02
Attending: FAMILY MEDICINE | Admitting: FAMILY MEDICINE
Payer: MEDICAID

## 2023-08-02 ENCOUNTER — APPOINTMENT (OUTPATIENT)
Dept: CT IMAGING | Facility: HOSPITAL | Age: 23
End: 2023-08-02
Payer: MEDICAID

## 2023-08-02 VITALS
RESPIRATION RATE: 16 BRPM | WEIGHT: 186 LBS | SYSTOLIC BLOOD PRESSURE: 113 MMHG | HEIGHT: 70 IN | BODY MASS INDEX: 26.63 KG/M2 | DIASTOLIC BLOOD PRESSURE: 77 MMHG | TEMPERATURE: 98 F | OXYGEN SATURATION: 96 % | HEART RATE: 66 BPM

## 2023-08-02 DIAGNOSIS — R56.9 SEIZURE: Primary | ICD-10-CM

## 2023-08-02 LAB
ANION GAP SERPL CALCULATED.3IONS-SCNC: 19 MMOL/L (ref 5–15)
APTT PPP: 25.1 SECONDS (ref 24.1–35)
BACTERIA UR QL AUTO: ABNORMAL /HPF
BASOPHILS # BLD AUTO: 0.1 10*3/MM3 (ref 0–0.2)
BASOPHILS NFR BLD AUTO: 0.9 % (ref 0–1.5)
BILIRUB UR QL STRIP: NEGATIVE
BUN SERPL-MCNC: 16 MG/DL (ref 6–20)
BUN/CREAT SERPL: 16 (ref 7–25)
CALCIUM SPEC-SCNC: 9.6 MG/DL (ref 8.6–10.5)
CHLORIDE SERPL-SCNC: 104 MMOL/L (ref 98–107)
CK SERPL-CCNC: 112 U/L (ref 20–200)
CLARITY UR: CLEAR
CO2 SERPL-SCNC: 18 MMOL/L (ref 22–29)
COLOR UR: YELLOW
CREAT SERPL-MCNC: 1 MG/DL (ref 0.76–1.27)
D-LACTATE SERPL-SCNC: 2.9 MMOL/L (ref 0.5–2)
D-LACTATE SERPL-SCNC: 7.2 MMOL/L (ref 0.5–2)
DEPRECATED RDW RBC AUTO: 35.8 FL (ref 37–54)
EGFRCR SERPLBLD CKD-EPI 2021: 108.5 ML/MIN/1.73
EOSINOPHIL # BLD AUTO: 0.4 10*3/MM3 (ref 0–0.4)
EOSINOPHIL NFR BLD AUTO: 3.7 % (ref 0.3–6.2)
ERYTHROCYTE [DISTWIDTH] IN BLOOD BY AUTOMATED COUNT: 11.4 % (ref 12.3–15.4)
GLUCOSE SERPL-MCNC: 131 MG/DL (ref 65–99)
GLUCOSE UR STRIP-MCNC: NEGATIVE MG/DL
HCT VFR BLD AUTO: 49 % (ref 37.5–51)
HGB BLD-MCNC: 16.2 G/DL (ref 13–17.7)
HGB UR QL STRIP.AUTO: NEGATIVE
HYALINE CASTS UR QL AUTO: ABNORMAL /LPF
IMM GRANULOCYTES # BLD AUTO: 0.04 10*3/MM3 (ref 0–0.05)
IMM GRANULOCYTES NFR BLD AUTO: 0.4 % (ref 0–0.5)
INR PPP: 0.91 (ref 0.91–1.09)
KETONES UR QL STRIP: NEGATIVE
LEUKOCYTE ESTERASE UR QL STRIP.AUTO: NEGATIVE
LYMPHOCYTES # BLD AUTO: 5.16 10*3/MM3 (ref 0.7–3.1)
LYMPHOCYTES NFR BLD AUTO: 48 % (ref 19.6–45.3)
MAGNESIUM SERPL-MCNC: 2.4 MG/DL (ref 1.6–2.6)
MCH RBC QN AUTO: 28.5 PG (ref 26.6–33)
MCHC RBC AUTO-ENTMCNC: 33.1 G/DL (ref 31.5–35.7)
MCV RBC AUTO: 86.3 FL (ref 79–97)
MONOCYTES # BLD AUTO: 1.17 10*3/MM3 (ref 0.1–0.9)
MONOCYTES NFR BLD AUTO: 10.9 % (ref 5–12)
NEUTROPHILS NFR BLD AUTO: 3.89 10*3/MM3 (ref 1.7–7)
NEUTROPHILS NFR BLD AUTO: 36.1 % (ref 42.7–76)
NITRITE UR QL STRIP: NEGATIVE
NRBC BLD AUTO-RTO: 0 /100 WBC (ref 0–0.2)
PH UR STRIP.AUTO: 6.5 [PH] (ref 5–8)
PLATELET # BLD AUTO: 273 10*3/MM3 (ref 140–450)
PMV BLD AUTO: 8.8 FL (ref 6–12)
POTASSIUM SERPL-SCNC: 4.2 MMOL/L (ref 3.5–5.2)
PROT UR QL STRIP: ABNORMAL
PROTHROMBIN TIME: 12.4 SECONDS (ref 11.8–14.8)
RBC # BLD AUTO: 5.68 10*6/MM3 (ref 4.14–5.8)
RBC # UR STRIP: ABNORMAL /HPF
REF LAB TEST METHOD: ABNORMAL
SODIUM SERPL-SCNC: 141 MMOL/L (ref 136–145)
SP GR UR STRIP: 1.02 (ref 1–1.03)
SQUAMOUS #/AREA URNS HPF: ABNORMAL /HPF
UROBILINOGEN UR QL STRIP: ABNORMAL
WBC # UR STRIP: ABNORMAL /HPF
WBC NRBC COR # BLD: 10.76 10*3/MM3 (ref 3.4–10.8)

## 2023-08-02 PROCEDURE — 70250 X-RAY EXAM OF SKULL: CPT

## 2023-08-02 PROCEDURE — 96374 THER/PROPH/DIAG INJ IV PUSH: CPT

## 2023-08-02 PROCEDURE — 80048 BASIC METABOLIC PNL TOTAL CA: CPT | Performed by: FAMILY MEDICINE

## 2023-08-02 PROCEDURE — 70450 CT HEAD/BRAIN W/O DYE: CPT

## 2023-08-02 PROCEDURE — 81001 URINALYSIS AUTO W/SCOPE: CPT | Performed by: FAMILY MEDICINE

## 2023-08-02 PROCEDURE — 93005 ELECTROCARDIOGRAM TRACING: CPT | Performed by: FAMILY MEDICINE

## 2023-08-02 PROCEDURE — 36415 COLL VENOUS BLD VENIPUNCTURE: CPT

## 2023-08-02 PROCEDURE — 85025 COMPLETE CBC W/AUTO DIFF WBC: CPT | Performed by: FAMILY MEDICINE

## 2023-08-02 PROCEDURE — 85610 PROTHROMBIN TIME: CPT | Performed by: FAMILY MEDICINE

## 2023-08-02 PROCEDURE — 71046 X-RAY EXAM CHEST 2 VIEWS: CPT

## 2023-08-02 PROCEDURE — 99284 EMERGENCY DEPT VISIT MOD MDM: CPT

## 2023-08-02 PROCEDURE — 83735 ASSAY OF MAGNESIUM: CPT | Performed by: FAMILY MEDICINE

## 2023-08-02 PROCEDURE — 82550 ASSAY OF CK (CPK): CPT | Performed by: FAMILY MEDICINE

## 2023-08-02 PROCEDURE — 74018 RADEX ABDOMEN 1 VIEW: CPT

## 2023-08-02 PROCEDURE — 0 LEVETIRACETAM IN NACL 0.75% 1000 MG/100ML SOLUTION: Performed by: FAMILY MEDICINE

## 2023-08-02 PROCEDURE — 85730 THROMBOPLASTIN TIME PARTIAL: CPT | Performed by: FAMILY MEDICINE

## 2023-08-02 PROCEDURE — 83605 ASSAY OF LACTIC ACID: CPT | Performed by: FAMILY MEDICINE

## 2023-08-02 PROCEDURE — 93010 ELECTROCARDIOGRAM REPORT: CPT | Performed by: STUDENT IN AN ORGANIZED HEALTH CARE EDUCATION/TRAINING PROGRAM

## 2023-08-02 RX ORDER — SODIUM CHLORIDE 0.9 % (FLUSH) 0.9 %
10 SYRINGE (ML) INJECTION AS NEEDED
Status: DISCONTINUED | OUTPATIENT
Start: 2023-08-02 | End: 2023-08-03 | Stop reason: HOSPADM

## 2023-08-02 RX ORDER — LEVETIRACETAM 10 MG/ML
1000 INJECTION INTRAVASCULAR ONCE
Status: COMPLETED | OUTPATIENT
Start: 2023-08-02 | End: 2023-08-02

## 2023-08-02 RX ADMIN — SODIUM CHLORIDE 1000 ML: 9 INJECTION, SOLUTION INTRAVENOUS at 19:36

## 2023-08-02 RX ADMIN — SODIUM CHLORIDE 1000 ML: 9 INJECTION, SOLUTION INTRAVENOUS at 20:48

## 2023-08-02 RX ADMIN — LEVETIRACETAM 1000 MG: 10 INJECTION INTRAVASCULAR at 19:42

## 2023-08-03 NOTE — ED PROVIDER NOTES
Subjective   History of Present Illness  23-year-old male with medical EMS for seizure.  Patient has a history of seizure disorder.  Patient denies any Lamictal for seizures.  Patient was at the ER picking up sticks and doing work.  Patient states that he was not drinking much water today.  Patient states he has a slight headache.  Patient denies any chest pain or shortness of breath.  Patient has no visual changes.  Patient has no weakness of the extremities.  Patient has no numbness and tingling of extremities.  Patient has no pain in extremities.  Patient has no nausea, vomiting.  Patient has no urinary symptoms.  Patient denies any other symptoms at this time.  Patient recently did have his shunt replaced.  Patient goes to [facility] for his shunt where his neurosurgeon has.  Patient has recently moved down to the area and is trying to establish care with neurosurgery here in [facility].    Review of Systems   Neurological:  Positive for seizures and headaches.   All other systems reviewed and are negative.    Past Medical History:   Diagnosis Date    Acute otitis media     Backache     Bradycardia     Common cold     Cough     Dyspnea     Fever     Headache     Hip pain     Joint pain of ankle and foot     Memory impairment     apparent, not definitive       S/P ventriculoperitoneal shunt     Sprain of ankle     Well child visit     immunization update          No Known Allergies    History reviewed. No pertinent surgical history.    History reviewed. No pertinent family history.    Social History     Socioeconomic History    Marital status: Single   Tobacco Use    Smoking status: Never    Smokeless tobacco: Never   Substance and Sexual Activity    Alcohol use: No    Drug use: No    Sexual activity: Defer           Objective   Physical Exam  Vitals and nursing note reviewed.   Constitutional:       Appearance: Normal appearance.   HENT:      Head: Normocephalic and atraumatic.       Mouth/Throat:      Mouth: Mucous membranes are moist.   Eyes:      Extraocular Movements: Extraocular movements intact.      Pupils: Pupils are equal, round, and reactive to light.   Cardiovascular:      Rate and Rhythm: Normal rate and regular rhythm.      Heart sounds: Normal heart sounds.   Pulmonary:      Effort: Pulmonary effort is normal.      Breath sounds: Normal breath sounds.   Abdominal:      General: Bowel sounds are normal.      Palpations: Abdomen is soft.      Tenderness: There is no abdominal tenderness.   Skin:     General: Skin is warm and dry.   Neurological:      General: No focal deficit present.      Mental Status: He is alert and oriented to person, place, and time.      Cranial Nerves: No cranial nerve deficit.      Sensory: No sensory deficit.      Motor: No weakness.      Coordination: Coordination normal.   Psychiatric:         Mood and Affect: Mood normal.         Behavior: Behavior normal.       Procedures           ED Course  ED Course as of 08/02/23 2121   Wed Aug 02, 2023   1932 EKG rate 83  Normal sinus rhythm  No STEMI [RP]      ED Course User Index  [RP] Jm Samuels MD                                         Lab Results (last 24 hours)       Procedure Component Value Units Date/Time    CBC & Differential [[id number]]  (Abnormal) Collected: 08/02/23 1935    Specimen: Blood Updated: 08/02/23 1945    Narrative:      The following orders were created for panel order CBC & Differential.  Procedure                               Abnormality         Status                     ---------                               -----------         ------                     CBC Auto Differential[[id number]]        Abnormal            Final result                 Please view results for these tests on the individual orders.    Basic Metabolic Panel [[id number]]  (Abnormal) Collected: 08/02/23 1935    Specimen: Blood Updated: 08/02/23 2003     Glucose 131 mg/dL      BUN 16 mg/dL      Creatinine 1.00 mg/dL       Sodium 141 mmol/L      Potassium 4.2 mmol/L      Comment: Slight hemolysis detected by analyzer. Results may be affected.        Chloride 104 mmol/L      CO2 18.0 mmol/L      Calcium 9.6 mg/dL      BUN/Creatinine Ratio 16.0     Anion Gap 19.0 mmol/L      eGFR 108.5 mL/min/1.73     Narrative:      GFR Normal >60  Chronic Kidney Disease <60  Kidney Failure <15      Protime-INR [[id number]]  (Normal) Collected: 08/02/23 1935    Specimen: Blood Updated: 08/02/23 1958     Protime 12.4 Seconds      INR 0.91    aPTT [[id number]]  (Normal) Collected: 08/02/23 1935    Specimen: Blood Updated: 08/02/23 1958     PTT 25.1 seconds     Lactic Acid, Plasma [[id number]]  (Abnormal) Collected: 08/02/23 1935    Specimen: Blood Updated: 08/02/23 2009     Lactate 7.2 mmol/L     CK [[id number]]  (Normal) Collected: 08/02/23 1935    Specimen: Blood Updated: 08/02/23 2003     Creatine Kinase 112 U/L     Magnesium [[id number]]  (Normal) Collected: 08/02/23 1935    Specimen: Blood Updated: 08/02/23 2003     Magnesium 2.4 mg/dL     CBC Auto Differential [[id number]]  (Abnormal) Collected: 08/02/23 1935    Specimen: Blood Updated: 08/02/23 1945     WBC 10.76 10*3/mm3      RBC 5.68 10*6/mm3      Hemoglobin 16.2 g/dL      Hematocrit 49.0 %      MCV 86.3 fL      MCH 28.5 pg      MCHC 33.1 g/dL      RDW 11.4 %      RDW-SD 35.8 fl      MPV 8.8 fL      Platelets 273 10*3/mm3      Neutrophil % 36.1 %      Lymphocyte % 48.0 %      Monocyte % 10.9 %      Eosinophil % 3.7 %      Basophil % 0.9 %      Immature Grans % 0.4 %      Neutrophils, Absolute 3.89 10*3/mm3      Lymphocytes, Absolute 5.16 10*3/mm3      Monocytes, Absolute 1.17 10*3/mm3      Eosinophils, Absolute 0.40 10*3/mm3      Basophils, Absolute 0.10 10*3/mm3      Immature Grans, Absolute 0.04 10*3/mm3      nRBC 0.0 /100 WBC     STAT Lactic Acid, Reflex [[id number]] Collected: 08/02/23 2059    Specimen: Blood Updated: 08/02/23 2105    Urinalysis With Culture If Indicated - Urine, Clean Catch  "[[id number]] Collected: 08/02/23 2112    Specimen: Urine, Clean Catch Updated: 08/02/23 2118            CT Head Without Contrast   Final Result   1. No acute intracranial abnormality. Decompressed slitlike frontal   horns of the lateral ventricles as well as slitlike appearance of the   third ventricle which are decompressed by a right parietal  shunt   catheter. With the slitlike appearance, the possibility of \"over   shunting\" should be considered.       This report was finalized on 08/02/2023 20:45 by Dr. All Saab MD.      XR Shunt Series   Final Result   The ventriculoperitoneal shunt catheter appears contiguous   and intact along its course through the neck, chest and into the   abdomen. The tip is seen within the left mid abdomen with several coiled   loops.   This report was finalized on 08/02/2023 20:23 by Dr. All Saab MD.          Medications   sodium chloride 0.9 % flush 10 mL (has no administration in time range)   sodium chloride 0.9 % bolus 1,000 mL (0 mL Intravenous Stopped 8/2/23 2048)   levETIRAcetam in NaCl 0.75% (KEPPRA) IVPB 1,000 mg (0 mg Intravenous Stopped 8/2/23 2027)   sodium chloride 0.9 % bolus 1,000 mL (1,000 mL Intravenous New Bag 8/2/23 2048)         Medical Decision Making  23-year-old male with a history of seizure disorder.  Patient has not had a seizure in 4.5 years.  Patient had 1 seizure today.  Patient does take Lamictal.  I discussed case with Dr. Sandoval neurologist on-call.  He has recommended getting a Lamictal level on the patient.  Patient has remained stable here in the emergency room.  I discussed the findings of the CAT scan with Dr. Sandoval.  Patient will follow-up with neurosurgery Dr. Tran on an outpatient basis.    Problems Addressed:  Seizure: complicated acute illness or injury    Amount and/or Complexity of Data Reviewed  Labs: ordered. Decision-making details documented in ED Course.  Radiology: ordered. Decision-making details documented in ED " Course.  ECG/medicine tests: ordered. Decision-making details documented in ED Course.  Discussion of management or test interpretation with external provider(s): Dr. Sandoval -neurology    Risk  Prescription drug management.        Final diagnoses:   Seizure       ED Disposition  ED Disposition       ED Disposition   Discharge    Condition   Stable    Comment   --               Hudson Douglas MD  [address] [place] [id number]  [phone]    Schedule an appointment as soon as possible for a visit       Tawanda Tran MD  [address] [id number] [phone]               Medication List      No changes were made to your prescriptions during this visit.            Jm Samuels MD  08/02/23 2772

## 2023-08-07 LAB
QT INTERVAL: 360 MS
QTC INTERVAL: 423 MS

## 2023-08-09 ENCOUNTER — OFFICE VISIT (OUTPATIENT)
Dept: NEUROSURGERY | Facility: CLINIC | Age: 23
End: 2023-08-09
Payer: COMMERCIAL

## 2023-08-09 VITALS — BODY MASS INDEX: 26.63 KG/M2 | WEIGHT: 186 LBS | HEIGHT: 70 IN

## 2023-08-09 DIAGNOSIS — G91.9 HYDROCEPHALUS, UNSPECIFIED TYPE: Primary | ICD-10-CM

## 2023-08-09 DIAGNOSIS — R56.9 SEIZURE: ICD-10-CM

## 2023-08-09 DIAGNOSIS — Z98.2 S/P VP SHUNT: ICD-10-CM

## 2023-08-09 DIAGNOSIS — E66.3 OVERWEIGHT WITH BODY MASS INDEX (BMI) OF 26 TO 26.9 IN ADULT: ICD-10-CM

## 2023-08-09 PROBLEM — Z86.69 HX OF HYDROCEPHALUS: Status: ACTIVE | Noted: 2023-08-09

## 2023-08-09 PROBLEM — G43.009 MIGRAINE WITHOUT AURA AND RESPONSIVE TO TREATMENT: Status: ACTIVE | Noted: 2019-06-21

## 2023-08-09 PROBLEM — G40.909 NON-REFRACTORY EPILEPSY: Status: ACTIVE | Noted: 2019-06-21

## 2023-08-09 PROBLEM — R00.1 SYMPTOMATIC BRADYCARDIA: Status: ACTIVE | Noted: 2022-06-07

## 2023-08-09 PROBLEM — G43.909 MIGRAINE: Status: ACTIVE | Noted: 2023-04-28

## 2023-08-09 PROBLEM — Z97.8 PRESENCE OF OTHER SPECIFIED DEVICES: Status: ACTIVE | Noted: 2023-04-28

## 2023-08-09 PROBLEM — Z86.69 PERSONAL HISTORY OF OTHER DISEASES OF THE NERVOUS SYSTEM AND SENSE ORGANS: Status: ACTIVE | Noted: 2023-04-28

## 2023-08-09 PROBLEM — Q25.0 PATENT DUCTUS ARTERIOSUS: Status: ACTIVE | Noted: 2021-10-01

## 2023-08-09 PROBLEM — F07.0 FRONTAL LOBE SYNDROME: Status: ACTIVE | Noted: 2021-10-01

## 2023-08-09 PROCEDURE — 99203 OFFICE O/P NEW LOW 30 MIN: CPT | Performed by: NEUROLOGICAL SURGERY

## 2023-08-09 RX ORDER — LACOSAMIDE 100 MG/1
TABLET ORAL
COMMUNITY
Start: 2023-04-28

## 2023-08-09 NOTE — PROGRESS NOTES
Primary Care Provider: Provider, No Known    Chief Complaint:   Chief Complaint   Patient presents with    hydrocephalus     New patient here for evaluation.         History of Present Illness  Abundio Smith is a 23 y.o. male with a prior history of ventriculoperitoneal shunt who presents today for establishment of care.    As a child shunt was placed by Dr. Guerrier at [facility].  This was for ventriculomegaly and hydrocephalus.    2015.  Had a revision.  At this point it was noted that his ventricles did dilate.    10/12/2022 patient has last shunt revision.  This was performed by Dr. Kirk at [facility].  This was done for headaches and syncopal-like event.  Patient eventually ended up having a cardiac evaluation and a pacer placed for syncope.    5/2023 patient syncopized.  Was evaluated by his neurologist at [facility].  Unclear if this was epileptiform behavior or whether it was cardiogenic in nature.    8/2/2023 - working in yard when he had a seizure.  Long history of seizures.  Patient is managed by neurologist at [facility].  Currently on Vimpat and lacosamide and Topamax.  Seizures have been relatively well-maintained.  Given his seizure-like activity went to the [facility] emergency room.  CT scan was performed at this time which showed normal size ventricles if anything slit ventricles.  He was referred to my office for further evaluation.    Currently no headaches.  No further syncopal events and no seizure-like activity since I saw him last.  No new weakness numbness or tingling.  No nausea and vomiting.  No daytime sleepiness.    Review of Systems   Constitutional: Negative.    Eyes: Negative.    Respiratory: Negative.     Cardiovascular: Negative.    Gastrointestinal: Negative.    Endocrine: Negative.    Genitourinary: Negative.    Musculoskeletal: Negative.    Skin: Negative.    Allergic/Immunologic: Negative.    Neurological:  Positive for dizziness, seizures and headaches.    Hematological: Negative.    Psychiatric/Behavioral: Negative.       Past Medical History:   Diagnosis Date    Acute otitis media     Backache     Bradycardia     Common cold     Cough     Dyspnea     Fever     Headache     Hip pain     Joint pain of ankle and foot     Memory impairment     apparent, not definitive       S/P ventriculoperitoneal shunt     Sprain of ankle     Well child visit     immunization update          No past surgical history on file.    Family History: family history is not on file.    Social History:  reports that he has never smoked. He has never used smokeless tobacco. He reports that he does not drink alcohol and does not use drugs.    Medications:    Current Outpatient Medications:     lacosamide (VIMPAT) 100 MG tablet tablet, Take 50 mg p.o. twice daily 1 week, then 100 mg p.o. twice daily 1 week, then 150 mg p.o. twice daily 1 week, then starting in the fourth week and thereafter take 200 mg p.o. twice daily., Disp: , Rfl:     ketorolac (TORADOL) 30 MG/ML injection, Infuse 2 mL into a venous catheter Every 8 (Eight) Hours As Needed for Moderate Pain., Disp: 10 mL, Rfl: 1    LACOSAMIDE PO, Take 200 mg by mouth 2 (Two) Times a Day., Disp: , Rfl:     promethazine (PHENERGAN) 25 MG tablet, Take 1 tablet by mouth Every 6 (Six) Hours As Needed for Nausea or Vomiting., Disp: 30 tablet, Rfl: 0    topiramate (TOPAMAX) 100 MG tablet, Take 1 tablet by mouth 2 (Two) Times a Day., Disp: , Rfl:     Allergies:  Patient has no known allergies.    Objective   Physical Exam  Constitutional:       Appearance: He is well-developed.   HENT:      Head: Normocephalic and atraumatic.   Eyes:      Extraocular Movements: EOM normal.      Conjunctiva/sclera: Conjunctivae normal.      Pupils: Pupils are equal, round, and reactive to light.      Funduscopic exam:     Right eye: No hemorrhage, exudate or papilledema.         Left eye: No hemorrhage, exudate or papilledema.   Cardiovascular:      Pulses:            Dorsalis pedis pulses are 2+ on the right side and 2+ on the left side.        Posterior tibial pulses are 2+ on the right side and 2+ on the left side.   Pulmonary:      Effort: Pulmonary effort is normal. No respiratory distress.   Musculoskeletal:      Cervical back: Normal range of motion and neck supple.   Skin:     General: Skin is warm.      Findings: No erythema or rash.   Neurological:      Mental Status: He is oriented to person, place, and time.      Coordination: Finger-Nose-Finger Test and Heel to Shin Test normal.      Gait: Gait is intact. Tandem walk normal.      Deep Tendon Reflexes:      Reflex Scores:       Tricep reflexes are 2+ on the right side and 2+ on the left side.       Bicep reflexes are 2+ on the right side and 2+ on the left side.       Brachioradialis reflexes are 2+ on the right side and 2+ on the left side.       Patellar reflexes are 2+ on the right side and 2+ on the left side.       Achilles reflexes are 2+ on the right side and 2+ on the left side.  Psychiatric:         Speech: Speech normal.     Neurologic Exam     Mental Status   Oriented to person, place, and time.   Registration: recalls 3 of 3 objects. Recall at 5 minutes: recalls 3 of 3 objects.   Attention: normal. Concentration: normal.   Speech: speech is normal   Level of consciousness: alert  Knowledge: consistent with education.     Cranial Nerves     CN II   Visual acuity: normal    CN III, IV, VI   Pupils are equal, round, and reactive to light.  Extraocular motions are normal.   Diplopia: none    CN V   Facial sensation intact.   Right corneal reflex: normal  Left corneal reflex: normal    CN VII   Right facial weakness: none  Left facial weakness: none    CN VIII   Hearing: intact    CN IX, X   Palate: symmetric  Right gag reflex: normal  Left gag reflex: normal    CN XI   Right trapezius strength: normal  Left trapezius strength: normal    CN XII   Tongue deviation: none    Motor Exam   Right arm tone:  "normal  Left arm tone: normal  Right arm pronator drift: absent  Left arm pronator drift: absent  Right leg tone: normal  Left leg tone: normal    Strength   Right deltoid: 5/5  Left deltoid: 5/5  Right biceps: 5/5  Left biceps: 5/5  Right triceps: 5/5  Left triceps: 5/5  Right interossei: 5/5  Left interossei: 5/5  Right iliopsoas: 5/5  Left iliopsoas: 5/5  Right quadriceps: 5/5  Left quadriceps: 5/5  Right anterior tibial: 5/5  Left anterior tibial: 5/5  Right gastroc: 5/5  Left gastroc: 5/5Right EHL 5/5   Left EHL 5/5     Sensory Exam   Light touch normal.   Proprioception normal.     Gait, Coordination, and Reflexes     Gait  Gait: normal    Coordination   Finger to nose coordination: normal  Heel to shin coordination: normal  Tandem walking coordination: normal    Reflexes   Right brachioradialis: 2+  Left brachioradialis: 2+  Right biceps: 2+  Left biceps: 2+  Right triceps: 2+  Left triceps: 2+  Right patellar: 2+  Left patellar: 2+  Right achilles: 2+  Left achilles: 2+  Right plantar: normal  Left plantar: normal  Right Cochran: absent  Left Cochran: absent  Right ankle clonus: absent  Left ankle clonus: absent      Imaging: (independent review and interpretation)  CT Head Without Contrast    Result Date: 8/2/2023  1. No acute intracranial abnormality. Decompressed slitlike frontal horns of the lateral ventricles as well as slitlike appearance of the third ventricle which are decompressed by a right parietal  shunt catheter. With the slitlike appearance, the possibility of \"over shunting\" should be considered.  This report was finalized on 08/02/2023 20:45 by Dr. All Saab MD.    XR Shunt Series    Result Date: 8/2/2023  The ventriculoperitoneal shunt catheter appears contiguous and intact along its course through the neck, chest and into the abdomen. The tip is seen within the left mid abdomen with several coiled loops. This report was finalized on 08/02/2023 20:23 by Dr. All Saab MD. "     8/2/2023.  CT of the head is performed.  No evidence of dilated ventricles.  Shunt in good position.      8/2023.  Shunt series evaluated.  No evidence of breaks.  Pacer noted in the chest.            ASSESSMENT and PLAN  Abundio Smith is a 23 y.o. male with a significant comorbidity of seizures, syncope with pacer, and shunted hydrocephalus. He presents with a new problem of syncope in May 2023 and again in August 2023 who reports to my office for establishment of care for treatment of ventriculoperitoneal shunt. Physical exam findings of neurologically intact.  His imaging shows CT scan suggestive of working shunt, ventriculoperitoneal shunt series with no evidence of breakage, and Codman Hakim valve set to 60.    Hydrocephalus status post VPS  Set to 60  Seizures  Abundio, his father, and I discussed his history presenting illness, physical exam and imaging findings.  While he did present with what sounds like a syncopal and/or questionable like seizure, I do believe that his shunt is working.  CT scan from 2015 at outside facility noted dilated ventricles in association with a shunt failure.  His last shunt revision was in 22 for headaches and syncope but he subsequently had a cardiac pacer placed.  Currently he is not displaying any acute signs of shunt malfunction.  Today we discussed possible options which included conservative care versus nuclear medicine shunt study.  At this point the family would like to consider other options but will favor conservative care and call our office should they want to pursue a shunt study.  Additionally patient would like to establish with an adult neurologist and referral to neurology will be placed today.        Diagnoses and all orders for this visit:    1. Hydrocephalus, unspecified type (Primary)    2. Seizure  -     Ambulatory Referral to Neurology    3. S/P  shunt    4. Overweight with body mass index (BMI) of 26 to 26.9 in adult        Return if symptoms  worsen or fail to improve.    Thank you for this Consultation and the opportunity to participate in Abundio's care.    Sincerely,  Tawanda Tran MD    I spent 39 minutes caring for Abundio on this date of service. This time includes time spent by me in the following activities: preparing for the visit, reviewing tests, obtaining and/or reviewing a separately obtained history, performing a medically appropriate examination and/or evaluation, counseling and educating the patient/family/caregiver, ordering medications, tests, or procedures, referring and communicating with other health care professionals, documenting information in the medical record, independently interpreting results and communicating that information with the patient/family/caregiver, and/or care coordination.

## 2023-08-10 ENCOUNTER — PATIENT ROUNDING (BHMG ONLY) (OUTPATIENT)
Dept: NEUROSURGERY | Facility: CLINIC | Age: 23
End: 2023-08-10
Payer: COMMERCIAL

## 2023-08-10 NOTE — PROGRESS NOTES
A My-Chart message has been sent to the patient for PATIENT ROUNDING with [facility].

## 2023-09-27 ENCOUNTER — CLINICAL SUPPORT (OUTPATIENT)
Dept: CARDIOLOGY | Facility: CLINIC | Age: 23
End: 2023-09-27
Payer: MEDICAID

## 2023-09-27 DIAGNOSIS — I45.5 SINUS PAUSE: Primary | ICD-10-CM

## 2023-09-27 DIAGNOSIS — R00.1 SYMPTOMATIC BRADYCARDIA: ICD-10-CM

## 2023-09-27 NOTE — PROGRESS NOTES
Pacemaker Evaluation Report    September 27, 2023    Primary Cardiologist: Dr. Morales  Implanting MD: Dr. Cohen  :Ortiz Model: Assurity MRI 2272 Serial Number: [id number]  Implant date: 6/8/22    Reason for evaluation: PPM, routine, office  Cardiac device indication(s): symptomatic bradycardia    Battery  BRYAN: 8.3-9.7 years     Interrogation Results  Atrial sensing: P wave: 3.0 mV  Atrial capture: 1.0 V @ 0.5 ms   Atrial lead impedance: 510 ohms  Ventricular sensing: R wave: 9.4 mV  Ventricular capture: 1.25 V @ 0.5 ms  Ventricular lead impedance: right  630 ohms    Parameters  Mode: DDDR  Base Rate: 60/125    Diagnostic Data  Atrial paced: 60 %   Ventricular paced: <1 %  Mode switch: 0%  AT/AF [place]: 0%  AHR: 0  VHR: 0    Intrinsic Rate: 68    Changes made: None    Conclusions:  Normal device function. Follow up in one year, remote every 3 months.    Assessment:  Sick sinus syndrome with multiple pauses                This document has been electronically signed by Alejandro Morales MD on September 29, 2023 11:38 CDT

## 2023-09-29 PROBLEM — R00.1 BRADYCARDIA, SINUS: Status: RESOLVED | Noted: 2022-04-26 | Resolved: 2023-09-29

## 2023-11-30 ENCOUNTER — OFFICE VISIT (OUTPATIENT)
Dept: NEUROLOGY | Facility: CLINIC | Age: 23
End: 2023-11-30
Payer: MEDICAID

## 2023-11-30 VITALS
DIASTOLIC BLOOD PRESSURE: 78 MMHG | HEART RATE: 73 BPM | OXYGEN SATURATION: 95 % | SYSTOLIC BLOOD PRESSURE: 124 MMHG | BODY MASS INDEX: 26.77 KG/M2 | HEIGHT: 70 IN | WEIGHT: 187 LBS

## 2023-11-30 DIAGNOSIS — G40.909 NONINTRACTABLE EPILEPSY WITHOUT STATUS EPILEPTICUS, UNSPECIFIED EPILEPSY TYPE: Primary | ICD-10-CM

## 2023-11-30 RX ORDER — LACOSAMIDE 200 MG/1
1 TABLET ORAL 2 TIMES DAILY
COMMUNITY
Start: 2023-08-15

## 2023-11-30 RX ORDER — DIVALPROEX SODIUM 500 MG/1
TABLET, DELAYED RELEASE ORAL
Qty: 60 TABLET | Refills: 5 | Status: SHIPPED | OUTPATIENT
Start: 2023-11-30

## 2023-11-30 NOTE — PATIENT INSTRUCTIONS
Week 1:     Continue Vimpat 200 mg twice day  Start Depakote (Divalproex) 500 mg at bedtime    Week 2:     Continue Vimpat 200 mg twice a day  Increase Depakote 500 mg twice a day    Week 3:   Decrease Vimpat 100 mg twice a day   Continue Depakote 500 mg twice a day    Week 4:     Decrease Vimpat to 100 mg at bedtime   Continue Depakote 500 mg twice a day.    Week 5:     Stop Vimpat  Continue Depakote 500 mg twice a day    Then we may make more changes

## 2023-11-30 NOTE — PROGRESS NOTES
"  Subjective        Abundio Smith presents to [facility]    History of Present Illness    Patient is a 23-year-old male with history of  sepsis, intraventricular hemorrhage with subsequent hydrocephalus, status post  shunt, referred for seizures.  He is accompanied by his mother.     His shunt was switched to a programmable shunt. When he has pressure, he is mildly better, but his ventricles have collapsed again. He has frequent headaches, which his mother attributes to low pressure in his shunt.  He has undergone numerous EEGs in the past, which have all been within normal limits. He has consistently been leaning over to  objects at onset of 3 generalized tonic-clonic seizures. His first seizure occurred when he was bent to tie his shoe, the second was when he was bending forward to  softballs, and the third occurred when he was bending to  tree limbs. His second seizure was witnessed and he was observed turning around, looking up, then he slid down a fence, stood up and looked around in confusion, and then he fell to the ground jerking. His mother has FaceTimed during a seizure and observed the patient grit his teeth, he had circumoral cyanosis, he was foaming at the mouth, his eyes were \"flipped back,\" his hands turn in, he was jerking rhythmically, and he bit his tongue. His seizures have been timed to last approximately 2 minutes and 45 seconds. In 2022, he was taking Vimpat 100 mg twice daily and he was prescribed Topamax to wean off of Vimpat. He developed multiple renal calculi while taking Topamax and Vimpat was restarted. He is currently taking Vimpat 200 mg twice daily. He is sleeping frequently and is constantly sedated.    He has a history of frequent falls that resulted in chin lacerations and fractured teeth, which were initially attributed to seizures; however, it became apparent that he was experiencing bradycardia with a heart rate " of 40 beats per minute. His bradycardia would occur suddenly when he was ambulating or standing normally. He has a pacemaker.     It appears he was last being seen at [facility] because of these issues. He had febrile seizures at 15 months old and then subsequent seizures starting in 12/2016. Per my review of the chart, he had normal EEG and MRI at [place] at the time of the first seizure. He was started on Trileptal. This was discontinued after another normal EEG. He had another seizure in 2019, in which the Trileptal was restarted, but he experienced some side effects. Seizures were described as generalized tonic-clonic. He had some other separate types of episodes which were more syncopal in nature, even though he felt some shaking in his eyes and falling, but no loss of consciousness or involuntary movements. He had a loop recorder during these, showing that his heart rate dropped to 37 beats per minute. He has previously been on oxcarbazepine which was discontinued due to side effects, Keppra which caused mood changes, and Topamax. He is more recently on Vimpat. It appears at the last visit, they reported some new type of spells with stiffening and mild confusion, which were very short lasting. These were thought to be nonepileptic. They had planned an EMU stay at that time.    Past Medical History:   Diagnosis Date    Acute otitis media     Backache     Bradycardia     Common cold     Cough     Developmental delay     Dyspnea     Fever     Headache     Hip pain     Joint pain of ankle and foot     Memory impairment     apparent, not definitive       Migraine     S/P ventriculoperitoneal shunt     Seizures     Sprain of ankle     Syncope     Well child visit     immunization update             Current Outpatient Medications:     lacosamide (VIMPAT) 200 MG tablet, Take 1 tablet by mouth 2 (Two) Times a Day., Disp: , Rfl:        Objective   Vital Signs:   /78 (BP Location: Left arm, Patient  "Position: Sitting, Cuff Size: Adult)   Pulse 73   Ht 177.8 cm (70\")   Wt 84.8 kg (187 lb)   SpO2 95%   BMI 26.83 kg/m²     Physical Exam   Neurological Exam    Result Review :                     Assessment and Plan     Patient is a 23-year-old male with history of  sepsis, intraventricular hemorrhage status post  shunt, frontal lobe dysfunction, and epilepsy, who I am seeing for epilepsy. He was previously being seen at [facility]. He has been on Keppra, Trileptal, Topamax, and Lamictal in the past, all with side effects. He is currently on Vimpat and is still having some breakthrough seizures, although the last one was in 2023. He is constantly sedated and they are concerned this is the Vimpat. He does snore and has non-refreshing sleep, so I think we can consider evaluating for sleep apnea in the future. However, we will try to switch his Vimpat to something else to see if these symptoms improve. They are also seeing Dr. Tran for management of his shunt and the mother has concerns that his pressure could be too low and maybe this is making his headaches worse. We decided to start with Depakote as this could help his seizures and potentially help headaches. If not, they can discuss with Dr. Tran again about other options.     Plan:  1. Start Depakote and wean Vimpat as per titration given in clinic. He will be on Depakote 500 mg twice daily and likely will increase in the future, but we will see how he tolerates this first.   2. We will hold on further EMU at this time since they have all been negative in the past and I do not think it will add to the current management.   3. We will obtain laboratory studies at his next visit.  4. Follow up in 8 weeks.    I spent 60 minutes caring for Abundio on this date of service. This time includes time spent by me in the following activities:preparing for the visit, reviewing tests, obtaining and/or reviewing a separately obtained " history, performing a medically appropriate examination and/or evaluation , counseling and educating the patient/family/caregiver, ordering medications, tests, or procedures, and documenting information in the medical record  Follow Up   No follow-ups on file.  Patient was given instructions and counseling regarding his condition or for health maintenance advice. Please see specific information pulled into the AVS if appropriate.     Transcribed from ambient dictation for Zabrina Stockton MD by Marc Junior.  12/01/23   15:48 CST    Patient or patient representative verbalized consent to the visit recording.  I have personally performed the services described in this document as transcribed by the above individual, and it is both accurate and complete.

## 2023-12-22 ENCOUNTER — HOSPITAL ENCOUNTER (OUTPATIENT)
Dept: CT IMAGING | Facility: HOSPITAL | Age: 23
Discharge: HOME | End: 2023-12-22
Payer: MEDICAID

## 2023-12-22 ENCOUNTER — TELEPHONE (OUTPATIENT)
Dept: FAMILY MEDICINE CLINIC | Facility: CLINIC | Age: 23
End: 2023-12-22
Payer: MEDICAID

## 2023-12-22 ENCOUNTER — OFFICE VISIT (OUTPATIENT)
Dept: FAMILY MEDICINE CLINIC | Facility: CLINIC | Age: 23
End: 2023-12-22
Payer: MEDICAID

## 2023-12-22 VITALS
HEIGHT: 70 IN | DIASTOLIC BLOOD PRESSURE: 85 MMHG | TEMPERATURE: 98.4 F | SYSTOLIC BLOOD PRESSURE: 126 MMHG | WEIGHT: 190 LBS | RESPIRATION RATE: 20 BRPM | OXYGEN SATURATION: 98 % | HEART RATE: 64 BPM | BODY MASS INDEX: 27.2 KG/M2

## 2023-12-22 DIAGNOSIS — R05.1 ACUTE COUGH: ICD-10-CM

## 2023-12-22 DIAGNOSIS — R22.31 MASS OF ARM, RIGHT: ICD-10-CM

## 2023-12-22 DIAGNOSIS — Q67.6 PECTUS EXCAVATUM: ICD-10-CM

## 2023-12-22 DIAGNOSIS — R09.81 NASAL CONGESTION: ICD-10-CM

## 2023-12-22 DIAGNOSIS — J18.9 PNEUMONIA OF RIGHT LOWER LOBE DUE TO INFECTIOUS ORGANISM: Primary | ICD-10-CM

## 2023-12-22 PROCEDURE — 71250 CT THORAX DX C-: CPT

## 2023-12-22 PROCEDURE — 1160F RVW MEDS BY RX/DR IN RCRD: CPT

## 2023-12-22 PROCEDURE — 1159F MED LIST DOCD IN RCRD: CPT

## 2023-12-22 PROCEDURE — 99214 OFFICE O/P EST MOD 30 MIN: CPT

## 2023-12-22 RX ORDER — CEFDINIR 300 MG/1
300 CAPSULE ORAL 2 TIMES DAILY
Qty: 20 CAPSULE | Refills: 0 | Status: SHIPPED | OUTPATIENT
Start: 2023-12-22 | End: 2024-01-01

## 2023-12-22 RX ORDER — FLUTICASONE PROPIONATE 50 MCG
2 SPRAY, SUSPENSION (ML) NASAL DAILY
Qty: 9.9 ML | Refills: 0 | Status: SHIPPED | OUTPATIENT
Start: 2023-12-22

## 2023-12-22 RX ORDER — CETIRIZINE HYDROCHLORIDE 10 MG/1
10 TABLET ORAL DAILY
Qty: 30 TABLET | Refills: 0 | Status: SHIPPED | OUTPATIENT
Start: 2023-12-22

## 2023-12-22 RX ORDER — AZITHROMYCIN 250 MG/1
TABLET, FILM COATED ORAL
Qty: 6 TABLET | Refills: 0 | Status: SHIPPED | OUTPATIENT
Start: 2023-12-22

## 2023-12-22 NOTE — TELEPHONE ENCOUNTER
Left voicemail for pt's guardian. Also sent [employer] message relaying below    RELAY  I tried to reach you by phone but was unsuccessful.     There was a small nodule in the right lower lobe of your lung. It looks to be infectious. Ale said we will treat you for pneumonia and follow up with a repeat CT in 2 weeks. I have already placed the orders so just go by the lab next door some time in 2 weeks.     There is also a coincidental finding of a pectus (chest muscle) deformity that is causing a mild degree of effect on your heart. This deformity is something that you have had for years. Ale has sent a message to Dr. Blanco (heart surgeon) to see if this is something he recommends you be treated for. If he recommends treatment, we will send in a referral and get you an appointment set up with him.      Please let me know if you have any questions.

## 2023-12-22 NOTE — PROGRESS NOTES
"Chief Complaint  Cough and Mass    Subjective    History of Present Illness      Patient presents to [facility] for   History of Present Illness  Pt is here today c/o of a cough x 2 weeks and a lump between his underarm and chest x atleast 2 weeks.  Pt reports that the cough is causing him to have pain in his chest.       Review of Systems   Respiratory:  Positive for cough.    All other systems reviewed and are negative.      I have reviewed and agree with the HPI and ROS information as above.  JOANA Balderas     Objective   Vital Signs:   /85   Pulse 64   Temp 98.4 °F (36.9 °C)   Resp 20   Ht 177.8 cm (70\")   Wt 86.2 kg (190 lb)   SpO2 98%   BMI 27.26 kg/m²     BMI is >= 25 and <30. (Overweight) The following options were offered after discussion;: exercise counseling/recommendations, nutrition counseling/recommendations, and pharmacological intervention options      Physical Exam  Constitutional:       Appearance: Normal appearance. He is well-developed.   HENT:      Head: Normocephalic and atraumatic.      Right Ear: Tympanic membrane, ear canal and external ear normal.      Left Ear: Tympanic membrane, ear canal and external ear normal.      Nose: Nose normal. No septal deviation, nasal tenderness or congestion.      Mouth/Throat:      Lips: Pink. No lesions.      Mouth: Mucous membranes are moist. No oral lesions.      Dentition: Normal dentition.      Pharynx: Oropharynx is clear. No pharyngeal swelling, oropharyngeal exudate or posterior oropharyngeal erythema.   Eyes:      General: Lids are normal. Vision grossly intact. No scleral icterus.        Right eye: No discharge.         Left eye: No discharge.      Extraocular Movements: Extraocular movements intact.      Conjunctiva/sclera: Conjunctivae normal.      Right eye: Right conjunctiva is not injected.      Left eye: Left conjunctiva is not injected.      Pupils: Pupils are equal, round, and reactive to " light.   Neck:      Thyroid: No thyroid mass.      Trachea: Trachea normal.   Cardiovascular:      Rate and Rhythm: Normal rate and regular rhythm.      Heart sounds: Normal heart sounds. No murmur heard.     No gallop.   Pulmonary:      Effort: Pulmonary effort is normal.      Breath sounds: Normal breath sounds and air entry. No wheezing, rhonchi or rales.   Abdominal:      General: There is no distension.      Palpations: Abdomen is soft. There is no mass.      Tenderness: There is no abdominal tenderness. There is no right CVA tenderness, left CVA tenderness, guarding or rebound.   Musculoskeletal:         General: No tenderness or deformity. Normal range of motion.      Cervical back: Full passive range of motion without pain, normal range of motion and neck supple.      Thoracic back: Normal.      Right lower leg: No edema.      Left lower leg: No edema.   Skin:     General: Skin is warm and dry.      Coloration: Skin is not jaundiced.      Findings: No rash.             Comments: Small non tender palpable mass to right upper arm    Neurological:      Mental Status: He is alert and oriented to person, place, and time.      Sensory: Sensation is intact.      Motor: Motor function is intact.      Coordination: Coordination is intact.      Gait: Gait is intact.      Deep Tendon Reflexes: Reflexes are normal and symmetric.   Psychiatric:         Mood and Affect: Mood and affect normal.         Judgment: Judgment normal.          ANDREAS-7:      PHQ-2 Depression Screening  Little interest or pleasure in doing things? 0-->not at all   Feeling down, depressed, or hopeless? 0-->not at all   PHQ-2 Total Score 0     PHQ-9 Depression Screening  Little interest or pleasure in doing things? 0-->not at all   Feeling down, depressed, or hopeless? 0-->not at all   Trouble falling or staying asleep, or sleeping too much?     Feeling tired or having little energy?     Poor appetite or overeating?     Feeling bad about yourself - or  that you are a failure or have let yourself or your family down?     Trouble concentrating on things, such as reading the newspaper or watching television?     Moving or speaking so slowly that other people could have noticed? Or the opposite - being so fidgety or restless that you have been moving around a lot more than usual?     Thoughts that you would be better off dead, or of hurting yourself in some way?     PHQ-9 Total Score 0   If you checked off any problems, how difficult have these problems made it for you to do your work, take care of things at home, or get along with other people?        Result Review  Data Reviewed:                   Assessment and Plan      Diagnoses and all orders for this visit:    1. Pneumonia of right lower lobe due to infectious organism (Primary)  -     cefdinir (OMNICEF) 300 MG capsule; Take 1 capsule by mouth 2 (Two) Times a Day for 10 days.  Dispense: 20 capsule; Refill: 0  -     azithromycin (Zithromax Z-Toy) 250 MG tablet; Take 2 tablets by mouth on day 1, then 1 tablet daily on days 2-5  Dispense: 6 tablet; Refill: 0    2. Acute cough  -     CT Chest Without Contrast; Future    3. Mass of arm, right  -     US Soft Tissue; Future    4. Nasal congestion  -     fluticasone (FLONASE) 50 MCG/ACT nasal spray; 2 sprays into the nostril(s) as directed by provider Daily.  Dispense: 9.9 mL; Refill: 0  -     cetirizine (zyrTEC) 10 MG tablet; Take 1 tablet by mouth Daily.  Dispense: 30 tablet; Refill: 0      Patient is seen today with his grandfather with complaints of an ongoing cough x 6 weeks.  He states that this is nonproductive.  He states that he feels that this started when he had a head cold.  Patient denies any fever or shortness of breath.  Patient also complains of a small palpable nodule on his upper right arm.  He states that it hurts when he coughs.  On exam today I can feel a small area which feels like a reactive lymph node.  Patient states that he does try to avoid  "coughing due to it hurting. Patient overall looks well and appears well. Exam is overall unremarkable with exception of a post nasal drip.  Patient does also have a pacemaker and a significant history.  I discussed with him that we will do a CT of his chest to get a more in-depth picture of his lungs.  Patient voices understanding.  I also encouraged the use of Flonase and Zyrtec.    Plan  Start Flonase and Zyrtec  US of soft tissue; nodule of right arm  CT of chest: \" No acute findings in the chest.Pectus excavatum deformity, likely causing a mild degree of mass effect  on the left atrium, 2 mm right lower lobe nodule, likely infectious or inflammatory. 4 mm left renal calculi.  Start Cefdinir 300mg and zpak to treat pneumonia  Contacted Dr. Blanco regarding deformity. Patient made aware and will arrange next steps  Repeat CT in 2 weeks.     CT Chest Without Contrast Diagnostic (12/22/2023 09:32)         Follow Up   Return if symptoms worsen or fail to improve.  Patient was given instructions and counseling regarding his condition or for health maintenance advice. Please see specific information pulled into the AVS if appropriate.       "

## 2023-12-22 NOTE — TELEPHONE ENCOUNTER
----- Message from JOANA Balderas sent at 12/22/2023 12:53 PM CST -----  Please let patient know that CT chest shows  A pectus deformity that is causing a mild degree of effect on his heart. I have put in a message to Dr. Blanco to see if this is something he will want to see him for.     Patient pertaining to his cough does have  2mm lower lobe nodule in the right lobe that is likely infectious so we will treat him for pneumonia and we will follow up with a repeat CT in 2 weeks.     Please let them know that the deformity is something he has had for years and once I hear from Dr. Blanco we will see how to proceed with this.

## 2023-12-26 ENCOUNTER — TELEPHONE (OUTPATIENT)
Dept: NEUROLOGY | Facility: CLINIC | Age: 23
End: 2023-12-26
Payer: MEDICAID

## 2023-12-26 ENCOUNTER — TELEPHONE (OUTPATIENT)
Dept: FAMILY MEDICINE CLINIC | Facility: CLINIC | Age: 23
End: 2023-12-26
Payer: MEDICAID

## 2023-12-26 NOTE — TELEPHONE ENCOUNTER
Left message to inform pt's mother that Ale followed up with Dr. Blanco: if pt is not bothered by chest CT findings, then he does not need to be seen by [facility].

## 2023-12-27 ENCOUNTER — TELEPHONE (OUTPATIENT)
Dept: FAMILY MEDICINE CLINIC | Facility: CLINIC | Age: 23
End: 2023-12-27
Payer: MEDICAID

## 2023-12-27 NOTE — TELEPHONE ENCOUNTER
Per Ale's request, attempted to contact [facility] to find out what age range of pts they see for a potential referral. Left message for Suzy to call back regarding the issue.

## 2023-12-27 NOTE — TELEPHONE ENCOUNTER
Suzy returned my call. Informed her regarding pt's situation. She states that Dr. Brady is out until next Wednesday but that she will confer with her other surgeon to get his recommendations. She will call us back later this week or next.

## 2024-01-04 ENCOUNTER — HOSPITAL ENCOUNTER (OUTPATIENT)
Dept: ULTRASOUND IMAGING | Facility: HOSPITAL | Age: 24
Discharge: HOME | End: 2024-01-04
Payer: MEDICAID

## 2024-01-04 DIAGNOSIS — R22.31 MASS OF ARM, RIGHT: ICD-10-CM

## 2024-01-04 PROCEDURE — 76882 US LMTD JT/FCL EVL NVASC XTR: CPT

## 2024-01-05 ENCOUNTER — TELEPHONE (OUTPATIENT)
Dept: FAMILY MEDICINE CLINIC | Facility: CLINIC | Age: 24
End: 2024-01-05
Payer: MEDICAID

## 2024-01-05 NOTE — TELEPHONE ENCOUNTER
Sent pt [employer] message relaying below    RELAY  Your ultrasound was normal. Please let me know if you have any questions.

## 2024-01-05 NOTE — TELEPHONE ENCOUNTER
----- Message from JOANA Balderas sent at 1/5/2024  7:11 AM CST -----  Please let patient know that US is normal;

## 2024-02-05 ENCOUNTER — TELEPHONE (OUTPATIENT)
Dept: NEUROLOGY | Facility: CLINIC | Age: 24
End: 2024-02-05
Payer: MEDICAID

## 2024-02-05 NOTE — TELEPHONE ENCOUNTER
Confirmed the patient's appointment with Dr. Stockton for Feb. 6th at 1145 am with the patient's mother.

## 2024-02-06 ENCOUNTER — OFFICE VISIT (OUTPATIENT)
Dept: NEUROLOGY | Facility: CLINIC | Age: 24
End: 2024-02-06
Payer: MEDICAID

## 2024-02-06 ENCOUNTER — LAB (OUTPATIENT)
Dept: LAB | Facility: HOSPITAL | Age: 24
End: 2024-02-06
Payer: MEDICAID

## 2024-02-06 VITALS
BODY MASS INDEX: 28.06 KG/M2 | WEIGHT: 196 LBS | DIASTOLIC BLOOD PRESSURE: 72 MMHG | SYSTOLIC BLOOD PRESSURE: 118 MMHG | RESPIRATION RATE: 18 BRPM | HEIGHT: 70 IN | HEART RATE: 68 BPM

## 2024-02-06 DIAGNOSIS — G40.909 NONINTRACTABLE EPILEPSY WITHOUT STATUS EPILEPTICUS, UNSPECIFIED EPILEPSY TYPE: Primary | ICD-10-CM

## 2024-02-06 DIAGNOSIS — G40.909 NONINTRACTABLE EPILEPSY WITHOUT STATUS EPILEPTICUS, UNSPECIFIED EPILEPSY TYPE: ICD-10-CM

## 2024-02-06 LAB
ALBUMIN SERPL-MCNC: 4.8 G/DL (ref 3.5–5.2)
ALBUMIN/GLOB SERPL: 1.8 G/DL
ALP SERPL-CCNC: 88 U/L (ref 39–117)
ALT SERPL W P-5'-P-CCNC: 36 U/L (ref 1–41)
ANION GAP SERPL CALCULATED.3IONS-SCNC: 10 MMOL/L (ref 5–15)
AST SERPL-CCNC: 22 U/L (ref 1–40)
BILIRUB SERPL-MCNC: 0.4 MG/DL (ref 0–1.2)
BUN SERPL-MCNC: 15 MG/DL (ref 6–20)
BUN/CREAT SERPL: 19.2 (ref 7–25)
CALCIUM SPEC-SCNC: 9.8 MG/DL (ref 8.6–10.5)
CHLORIDE SERPL-SCNC: 102 MMOL/L (ref 98–107)
CO2 SERPL-SCNC: 29 MMOL/L (ref 22–29)
CREAT SERPL-MCNC: 0.78 MG/DL (ref 0.76–1.27)
DEPRECATED RDW RBC AUTO: 36.6 FL (ref 37–54)
EGFRCR SERPLBLD CKD-EPI 2021: 128.5 ML/MIN/1.73
ERYTHROCYTE [DISTWIDTH] IN BLOOD BY AUTOMATED COUNT: 11.4 % (ref 12.3–15.4)
GLOBULIN UR ELPH-MCNC: 2.7 GM/DL
GLUCOSE SERPL-MCNC: 98 MG/DL (ref 65–99)
HCT VFR BLD AUTO: 51.2 % (ref 37.5–51)
HGB BLD-MCNC: 16.9 G/DL (ref 13–17.7)
MCH RBC QN AUTO: 28.7 PG (ref 26.6–33)
MCHC RBC AUTO-ENTMCNC: 33 G/DL (ref 31.5–35.7)
MCV RBC AUTO: 87.1 FL (ref 79–97)
PLATELET # BLD AUTO: 242 10*3/MM3 (ref 140–450)
PMV BLD AUTO: 9.2 FL (ref 6–12)
POTASSIUM SERPL-SCNC: 4.2 MMOL/L (ref 3.5–5.2)
PROT SERPL-MCNC: 7.5 G/DL (ref 6–8.5)
RBC # BLD AUTO: 5.88 10*6/MM3 (ref 4.14–5.8)
SODIUM SERPL-SCNC: 141 MMOL/L (ref 136–145)
VALPROATE SERPL-MCNC: 75.4 MCG/ML (ref 50–125)
WBC NRBC COR # BLD AUTO: 6.92 10*3/MM3 (ref 3.4–10.8)

## 2024-02-06 PROCEDURE — 1159F MED LIST DOCD IN RCRD: CPT | Performed by: PSYCHIATRY & NEUROLOGY

## 2024-02-06 PROCEDURE — 80053 COMPREHEN METABOLIC PANEL: CPT

## 2024-02-06 PROCEDURE — 1160F RVW MEDS BY RX/DR IN RCRD: CPT | Performed by: PSYCHIATRY & NEUROLOGY

## 2024-02-06 PROCEDURE — 80164 ASSAY DIPROPYLACETIC ACD TOT: CPT

## 2024-02-06 PROCEDURE — 36415 COLL VENOUS BLD VENIPUNCTURE: CPT

## 2024-02-06 PROCEDURE — 85027 COMPLETE CBC AUTOMATED: CPT

## 2024-02-06 PROCEDURE — 99214 OFFICE O/P EST MOD 30 MIN: CPT | Performed by: PSYCHIATRY & NEUROLOGY

## 2024-02-06 NOTE — PROGRESS NOTES
"  Subjective        Abundiolaurita Smith presents to [facility]    History of Present Illness  23-year-old male here for follow-up.  At last visit we weaned him off Vimpat and started Depakote, due to potential side effects of Vimpat and he was still having seizures.  He has done well since then.  He has been less sleepy than he was previously.  No seizures.  Is eating more and has gained some weight.  Last seizure was in May 2023 there was a concern of a blackout episode in August that may or may not have been a seizure.      Past Medical History:   Diagnosis Date    Acute otitis media     Backache     Bradycardia     Common cold     Cough     Developmental delay     Dyspnea     Fever     Headache     Hip pain     Joint pain of ankle and foot     Memory impairment     apparent, not definitive       Migraine     S/P ventriculoperitoneal shunt     Seizures     Sprain of ankle     Syncope     Well child visit     immunization update             Current Outpatient Medications:     divalproex (DEPAKOTE) 500 MG DR tablet, 500 mg qhs x 7 days then 500 mg BID thereafter, Disp: 60 tablet, Rfl: 5    azithromycin (Zithromax Z-Toy) 250 MG tablet, Take 2 tablets by mouth on day 1, then 1 tablet daily on days 2-5, Disp: 6 tablet, Rfl: 0    fluticasone (FLONASE) 50 MCG/ACT nasal spray, 2 sprays into the nostril(s) as directed by provider Daily., Disp: 9.9 mL, Rfl: 0    lacosamide (VIMPAT) 200 MG tablet, Take 1 tablet by mouth 2 (Two) Times a Day., Disp: , Rfl:        Objective   Vital Signs:   /72 (BP Location: Left arm)   Pulse 68   Resp 18   Ht 177.8 cm (70\")   Wt 88.9 kg (196 lb)   BMI 28.12 kg/m²     Physical Exam  Constitutional:       General: He is awake.   Eyes:      Extraocular Movements: Extraocular movements intact.   Neurological:      Mental Status: He is alert.   Psychiatric:         Speech: Speech normal.        Neurological Exam  Mental Status  Awake and alert. Speech is normal. " Language is fluent with no aphasia.    Cranial Nerves  CN III, IV, VI: Extraocular movements intact bilaterally.  CN VII: Full and symmetric facial movement.    Gait  Casual gait is normal including stance, stride, and arm swing.      Result Review :                     Assessment and Plan     Patient is a 23-year-old male with history of  sepsis, intraventricular hemorrhage status post  shunt, frontal lobe dysfunction, and epilepsy, who I am seeing for epilepsy. He was previously being seen at [facility]. He has been on Keppra, Trileptal, Topamax, and Lamictal in the past, all with side effects.  We weaned him off Vimpat at last visit and started Depakote.  He was having side effects to Vimpat.  Was still having seizures as well.  He is following with Dr. Tran for his shunt.  He has done well since switching to Depakote.    Plan:  1.  Continue Depakote 500 mg twice daily.  2.  Depakote level, CBC, CMP today.  3.  Follow-up 3 months.    Follow Up   No follow-ups on file.  Patient was given instructions and counseling regarding his condition or for health maintenance advice. Please see specific information pulled into the AVS if appropriate.

## 2024-06-10 DIAGNOSIS — G40.909 NONINTRACTABLE EPILEPSY WITHOUT STATUS EPILEPTICUS, UNSPECIFIED EPILEPSY TYPE: ICD-10-CM

## 2024-06-10 RX ORDER — DIVALPROEX SODIUM 500 MG/1
500 TABLET, DELAYED RELEASE ORAL 2 TIMES DAILY
Qty: 60 TABLET | Refills: 2 | Status: SHIPPED | OUTPATIENT
Start: 2024-06-10

## 2024-07-23 ENCOUNTER — OFFICE VISIT (OUTPATIENT)
Dept: NEUROLOGY | Facility: CLINIC | Age: 24
End: 2024-07-23
Payer: MEDICAID

## 2024-07-23 VITALS
HEART RATE: 67 BPM | SYSTOLIC BLOOD PRESSURE: 142 MMHG | OXYGEN SATURATION: 97 % | WEIGHT: 192.8 LBS | DIASTOLIC BLOOD PRESSURE: 88 MMHG | HEIGHT: 70 IN | BODY MASS INDEX: 27.6 KG/M2

## 2024-07-23 DIAGNOSIS — G43.719 INTRACTABLE CHRONIC MIGRAINE WITHOUT AURA AND WITHOUT STATUS MIGRAINOSUS: Primary | ICD-10-CM

## 2024-07-23 DIAGNOSIS — G40.909 NONINTRACTABLE EPILEPSY WITHOUT STATUS EPILEPTICUS, UNSPECIFIED EPILEPSY TYPE: ICD-10-CM

## 2024-07-23 PROCEDURE — 99214 OFFICE O/P EST MOD 30 MIN: CPT | Performed by: PSYCHIATRY & NEUROLOGY

## 2024-07-23 PROCEDURE — 1159F MED LIST DOCD IN RCRD: CPT | Performed by: PSYCHIATRY & NEUROLOGY

## 2024-07-23 PROCEDURE — 1160F RVW MEDS BY RX/DR IN RCRD: CPT | Performed by: PSYCHIATRY & NEUROLOGY

## 2024-07-23 RX ORDER — NORTRIPTYLINE HYDROCHLORIDE 25 MG/1
25 CAPSULE ORAL NIGHTLY
Qty: 30 CAPSULE | Refills: 2 | Status: SHIPPED | OUTPATIENT
Start: 2024-07-23

## 2024-07-23 RX ORDER — DIVALPROEX SODIUM 500 MG/1
500 TABLET, DELAYED RELEASE ORAL 2 TIMES DAILY
Qty: 180 TABLET | Refills: 3 | Status: SHIPPED | OUTPATIENT
Start: 2024-07-23 | End: 2025-07-23

## 2024-07-23 NOTE — PROGRESS NOTES
"  Subjective        Abundiolaurita Smith presents to [facility]    History of Present Illness  25 yo male here for follow up. No seizures since last visit. However, headaches have been more frequent. Using OTC prn. Now having them every day at work. Over the last few months. 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 Mom is worried about this              Current Outpatient Medications:     divalproex (DEPAKOTE) 500 MG DR tablet, Take 1 tablet by mouth 2 (Two) Times a Day., Disp: 60 tablet, Rfl: 2       Objective   Vital Signs:   /88   Pulse 67   Ht 177.8 cm (70\")   Wt 87.5 kg (192 lb 12.8 oz)   SpO2 97%   BMI 27.66 kg/m²     Physical Exam  Constitutional:       General: He is awake.   Eyes:      Extraocular Movements: Extraocular movements intact.   Neurological:      Mental Status: He is alert.   Psychiatric:         Speech: Speech normal.      Neurological Exam  Mental Status  Awake and alert. Speech is normal. Language is fluent with no aphasia.    Cranial Nerves  CN III, IV, VI: Extraocular movements intact bilaterally.  CN VII: Full and symmetric facial movement.    Gait  Casual gait is normal including stance, stride, and arm swing.      Result Review :                     Assessment and Plan     24-year-old male with history of  sepsis, intraventricular hemorrhage s/p  shunt, frontal lobe dysfunction, and epilepsy, who I am seeing for epilepsy. He was previously being seen at [facility]. He has been on Keppra, Trileptal, Topamax, and Lamictal in the past, all with side effects.  We weaned him off Vimpat at last visit and started Depakote.  He was having side effects to Vimpat.  Was still having seizures as well.  He has seen Dr. Tran for his shunt.  He has done well since switching to Depakote. Recent labs stable.  Was having more headaches recently.  Mother is concerned this is related to his shunt.  I told him that he can call " make an appointment with Dr. Tran if they have any concerns.  However I will go ahead and start him on nortriptyline.    Plan:  1.  Continue Depakote 500 mg twice daily.  2.  Start nortriptyline 25 mg nightly.  3.  Follow-up 2 months.      Follow Up   No follow-ups on file.  Patient was given instructions and counseling regarding his condition or for health maintenance advice. Please see specific information pulled into the AVS if appropriate.

## 2024-08-15 DIAGNOSIS — G43.719 INTRACTABLE CHRONIC MIGRAINE WITHOUT AURA AND WITHOUT STATUS MIGRAINOSUS: ICD-10-CM

## 2024-08-16 RX ORDER — NORTRIPTYLINE HYDROCHLORIDE 25 MG/1
25 CAPSULE ORAL NIGHTLY
Qty: 90 CAPSULE | Refills: 0 | OUTPATIENT
Start: 2024-08-16

## 2024-08-26 ENCOUNTER — TELEPHONE (OUTPATIENT)
Dept: NEUROSURGERY | Facility: CLINIC | Age: 24
End: 2024-08-26

## 2024-08-27 ENCOUNTER — OFFICE VISIT (OUTPATIENT)
Dept: NEUROSURGERY | Facility: CLINIC | Age: 24
End: 2024-08-27
Payer: MEDICAID

## 2024-08-27 ENCOUNTER — HOSPITAL ENCOUNTER (OUTPATIENT)
Dept: GENERAL RADIOLOGY | Facility: HOSPITAL | Age: 24
Discharge: HOME | End: 2024-08-27
Admitting: NURSE PRACTITIONER
Payer: MEDICAID

## 2024-08-27 VITALS — HEIGHT: 70 IN | BODY MASS INDEX: 27.49 KG/M2 | WEIGHT: 192 LBS

## 2024-08-27 DIAGNOSIS — G91.9 HYDROCEPHALUS, UNSPECIFIED TYPE: ICD-10-CM

## 2024-08-27 DIAGNOSIS — E66.3 OVERWEIGHT (BMI 25.0-29.9): ICD-10-CM

## 2024-08-27 DIAGNOSIS — G91.9 HYDROCEPHALUS, UNSPECIFIED TYPE: Primary | ICD-10-CM

## 2024-08-27 DIAGNOSIS — R51.9 FREQUENT HEADACHES: ICD-10-CM

## 2024-08-27 PROCEDURE — 1160F RVW MEDS BY RX/DR IN RCRD: CPT | Performed by: NURSE PRACTITIONER

## 2024-08-27 PROCEDURE — 71046 X-RAY EXAM CHEST 2 VIEWS: CPT

## 2024-08-27 PROCEDURE — 99214 OFFICE O/P EST MOD 30 MIN: CPT | Performed by: NURSE PRACTITIONER

## 2024-08-27 PROCEDURE — 70250 X-RAY EXAM OF SKULL: CPT

## 2024-08-27 PROCEDURE — 1159F MED LIST DOCD IN RCRD: CPT | Performed by: NURSE PRACTITIONER

## 2024-08-27 PROCEDURE — 74018 RADEX ABDOMEN 1 VIEW: CPT

## 2024-08-27 NOTE — PATIENT INSTRUCTIONS
"DASH Eating Plan  DASH stands for Dietary Approaches to Stop Hypertension. The DASH eating plan is a healthy eating plan that has been shown to:  Lower high blood pressure (hypertension).  Reduce your risk for type 2 diabetes, heart disease, and stroke.  Help with weight loss.  What are tips for following this plan?  Reading food labels  Check food labels for the amount of salt (sodium) per serving. Choose foods with less than 5 percent of the Daily Value (DV) of sodium. In general, foods with less than 300 milligrams (mg) of sodium per serving fit into this eating plan.  To find whole grains, look for the word \"whole\" as the first word in the ingredient list.  Shopping  Buy products labeled as \"low-sodium\" or \"no salt added.\"  Buy fresh foods. Avoid canned foods and pre-made or frozen meals.  Cooking  Try not to add salt when you cook. Use salt-free seasonings or herbs instead of table salt or sea salt. Check with your health care provider or pharmacist before using salt substitutes.  Do not frias foods. Cook foods in healthy ways, such as baking, boiling, grilling, roasting, or broiling.  Cook using oils that are good for your heart. These include olive, canola, avocado, soybean, and sunflower oil.  Meal planning    Eat a balanced diet. This should include:  4 or more servings of fruits and 4 or more servings of vegetables each day. Try to fill half of your plate with fruits and vegetables.  6-8 servings of whole grains each day.  6 or less servings of lean meat, poultry, or fish each day. 1 oz is 1 serving. A 3 oz (85 g) serving of meat is about the same size as the palm of your hand. One egg is 1 oz (28 g).  2-3 servings of low-fat dairy each day. One serving is 1 cup (237 mL).  1 serving of nuts, seeds, or beans 5 times each week.  2-3 servings of heart-healthy fats. Healthy fats called omega-3 fatty acids are found in foods such as walnuts, flaxseeds, fortified milks, and eggs. These fats are also found in " cold-water fish, such as sardines, salmon, and mackerel.  Limit how much you eat of:  Canned or prepackaged foods.  Food that is high in trans fat, such as fried foods.  Food that is high in saturated fat, such as fatty meat.  Desserts and other sweets, sugary drinks, and other foods with added sugar.  Full-fat dairy products.  Do not salt foods before eating.  Do not eat more than 4 egg yolks a week.  Try to eat at least 2 vegetarian meals a week.  Eat more home-cooked food and less restaurant, buffet, and fast food.  Lifestyle  When eating at a restaurant, ask if your food can be made with less salt or no salt.  If you drink alcohol:  Limit how much you have to:  0-1 drink a day if you are female.  0-2 drinks a day if you are male.  Know how much alcohol is in your drink. In the [place] one drink is one 12 oz bottle of beer (355 mL), one 5 oz glass of wine (148 mL), or one 1½ oz glass of hard liquor (44 mL).  General information  Avoid eating more than 2,300 mg of salt a day. If you have hypertension, you may need to reduce your sodium intake to 1,500 mg a day.  Work with your provider to stay at a healthy body weight or lose weight. Ask what the best weight range is for you.  On most days of the week, get at least 30 minutes of exercise that causes your heart to beat faster. This may include walking, swimming, or biking.  Work with your provider or dietitian to adjust your eating plan to meet your specific calorie needs.  What foods should I eat?  Fruits  All fresh, dried, or frozen fruit. Canned fruits that are in their natural juice and do not have sugar added to them.  Vegetables  Fresh or frozen vegetables that are raw, steamed, roasted, or grilled. Low-sodium or reduced-sodium tomato and vegetable juice. Low-sodium or reduced-sodium tomato sauce and tomato paste. Low-sodium or reduced-sodium canned vegetables.  Grains  Whole-grain or whole-wheat bread. Whole-grain or whole-wheat pasta. Brown rice. Oatmeal.  Quinoa. Bulgur. Whole-grain and low-sodium cereals. Neda bread. Low-fat, low-sodium crackers. Whole-wheat flour tortillas.  Meats and other proteins  Skinless chicken or turkey. Ground chicken or turkey. Pork with fat trimmed off. Fish and seafood. Egg whites. Dried beans, peas, or lentils. Unsalted nuts, nut butters, and seeds. Unsalted canned beans. Lean cuts of beef with fat trimmed off. Low-sodium, lean precooked or cured meat, such as sausages or meat loaves.  Dairy  Low-fat (1%) or fat-free (skim) milk. Reduced-fat, low-fat, or fat-free cheeses. Nonfat, low-sodium ricotta or cottage cheese. Low-fat or nonfat yogurt. Low-fat, low-sodium cheese.  Fats and oils  Soft margarine without trans fats. Vegetable oil. Reduced-fat, low-fat, or light mayonnaise and salad dressings (reduced-sodium). Canola, safflower, olive, avocado, soybean, and sunflower oils. Avocado.  Seasonings and condiments  Herbs. Spices. Seasoning mixes without salt.  Other foods  Unsalted popcorn and pretzels. Fat-free sweets.  The items listed above may not be all the foods and drinks you can have. Talk to a dietitian to learn more.  What foods should I avoid?  Fruits  Canned fruit in a light or heavy syrup. Fried fruit. Fruit in cream or butter sauce.  Vegetables  Creamed or fried vegetables. Vegetables in a cheese sauce. Regular canned vegetables that are not marked as low-sodium or reduced-sodium. Regular canned tomato sauce and paste that are not marked as low-sodium or reduced-sodium. Regular tomato and vegetable juices that are not marked as low-sodium or reduced-sodium. Pickles. Olives.  Grains  Baked goods made with fat, such as croissants, muffins, or some breads. Dry pasta or rice meal packs.  Meats and other proteins  Fatty cuts of meat. Ribs. Fried meat. Azar. Bologna, salami, and other precooked or cured meats, such as sausages or meat loaves, that are not lean and low in sodium. Fat from the back of a pig (fatback). Lucrecia.  Salted nuts and seeds. Canned beans with added salt. Canned or smoked fish. Whole eggs or egg yolks. Chicken or turkey with skin.  Dairy  Whole or 2% milk, cream, and half-and-half. Whole or full-fat cream cheese. Whole-fat or sweetened yogurt. Full-fat cheese. Nondairy creamers. Whipped toppings. Processed cheese and cheese spreads.  Fats and oils  Butter. Stick margarine. Lard. Shortening. Ghee. Azar fat. Tropical oils, such as coconut, palm kernel, or palm oil.  Seasonings and condiments  Onion salt, garlic salt, seasoned salt, table salt, and sea salt. Worcestershire sauce. Tartar sauce. Barbecue sauce. Teriyaki sauce. Soy sauce, including reduced-sodium soy sauce. Steak sauce. Canned and packaged gravies. Fish sauce. Oyster sauce. Cocktail sauce. Store-bought horseradish. Ketchup. Mustard. Meat flavorings and tenderizers. Bouillon cubes. Hot sauces. Pre-made or packaged marinades. Pre-made or packaged taco seasonings. Relishes. Regular salad dressings.  Other foods  Salted popcorn and pretzels.  The items listed above may not be all the foods and drinks you should avoid. Talk to a dietitian to learn more.  Where to find more information  National Heart, Lung, and Blood [place] (NHLBI): nhlbi.nih.gov  American Heart Association (AHA): heart.org  Academy of Nutrition and Dietetics: eatright.org  National Kidney Foundation (NKF): kidney.org  This information is not intended to replace advice given to you by your health care provider. Make sure you discuss any questions you have with your health care provider.  Document Revised: 01/04/2024 Document Reviewed: 01/04/2024  Elsevier Patient Education © 2024 [employer] Inc.

## 2024-08-27 NOTE — PROGRESS NOTES
Chief complaint:   Chief Complaint   Patient presents with    Headache     Subjective     HPI:   Abundio Smith is a 24 y.o.  male who presents today with his grandmother for evaluation of worsening headaches with a history of hydrocephalus status post  shunt placement.    Tere initial shunt was placed by Dr. Guerrier at [place] for ventriculomegaly and hydrocephalus.  His shunt was initially revised in 2015 for progressive ventricular dilatation.    The shunt was revised again by Dr. Kirk at [facility] for persistent headaches and syncopal-like events.  He was eventually evaluated by cardiology and has undergone pacemaker placement for episodic syncope that has since resolved.     He is currently under the care of Dr. Sauer for his seizure and migraine headaches.  He is currently taking Depakote and Pamelor.  He has had no seizure like activity in 2+ years.  Family states the seizures were typically triggered by either smells or bending forward at the waist.    Over the past 2 months he reports a progressively worsening frontal headaches.  His headaches occur daily and typically resolve with ibuprofen.  His last severe headache was 7-10 days ago and associated with nausea.  He denies further episodes of syncope, dizziness, visual disturbances, daytime sleepiness, unilateral weakness, upper or lower extremity numbness, tingling, or weakness, or vomiting.  He does express some concern for persistent chemical smells at his new job at [employer], in the [employer] section is contributing to his worsening headaches.  He currently rates the severity of his symptoms 7/10.    ROS  Review of Systems   Constitutional: Negative.    HENT: Negative.     Eyes: Negative.  Negative for visual disturbance.   Respiratory: Negative.     Cardiovascular: Negative.    Gastrointestinal: Negative.  Positive for nausea. Negative for vomiting.   Endocrine: Negative.    Genitourinary: Negative.   "  Musculoskeletal: Negative.    Skin: Negative.    Allergic/Immunologic: Negative.    Neurological: Negative.  Positive for headaches. Negative for dizziness, tremors, seizures, syncope, facial asymmetry, speech difficulty, weakness, light-headedness and numbness.   Hematological: Negative.    Psychiatric/Behavioral: Negative.     All other systems reviewed and are negative.    PFSH:  Past Medical History:   Diagnosis Date    Acute otitis media     Backache     Bradycardia     Common cold     Cough     Developmental delay     Dyspnea     Fever     Headache     Hip pain     Joint pain of ankle and foot     Memory impairment     apparent, not definitive       Migraine     S/P ventriculoperitoneal shunt     Seizures     Sprain of ankle     Syncope     Well child visit     immunization update        Past Surgical History:   Procedure Laterality Date    VENTRICULOPERITONEAL SHUNT  2000    Replaced with programable 2022     Objective      Current Outpatient Medications   Medication Sig Dispense Refill    divalproex (DEPAKOTE) 500 MG DR tablet Take 1 tablet by mouth 2 (Two) Times a Day. 180 tablet 3    nortriptyline (Pamelor) 25 MG capsule Take 1 capsule by mouth Every Night. 30 capsule 2     No current facility-administered medications for this visit.     Vital Signs  Ht 177.8 cm (70\")   Wt 87.1 kg (192 lb)   BMI 27.55 kg/m²   Physical Exam  Vitals and nursing note reviewed.   Constitutional:       General: He is not in acute distress.     Appearance: Normal appearance. He is well-developed, well-groomed and overweight. He is not ill-appearing, toxic-appearing or diaphoretic.      Comments: BMI 27.55   HENT:      Head: Normocephalic and atraumatic.        Right Ear: Hearing normal.      Left Ear: Hearing normal.   Eyes:      General: Lids are normal.      Extraocular Movements: Extraocular movements intact.      Conjunctiva/sclera: Conjunctivae normal.      Pupils: Pupils are equal, round, and reactive to light.   Neck: "      Trachea: Trachea normal.   Cardiovascular:      Rate and Rhythm: Normal rate and regular rhythm.   Pulmonary:      Effort: Pulmonary effort is normal. No tachypnea, bradypnea, accessory muscle usage or respiratory distress.   Abdominal:      Palpations: Abdomen is soft.   Musculoskeletal:      Cervical back: Full passive range of motion without pain and neck supple.   Skin:     General: Skin is warm and dry.   Neurological:      GCS: GCS eye subscore is 4. GCS verbal subscore is 5. GCS motor subscore is 6.      Coordination: Coordination is intact.      Deep Tendon Reflexes:      Reflex Scores:       Tricep reflexes are 2+ on the right side and 2+ on the left side.       Bicep reflexes are 2+ on the right side and 2+ on the left side.       Brachioradialis reflexes are 2+ on the right side and 2+ on the left side.       Patellar reflexes are 2+ on the right side and 2+ on the left side.       Achilles reflexes are 2+ on the right side and 2+ on the left side.  Psychiatric:         Speech: Speech normal.         Behavior: Behavior normal. Behavior is cooperative.       Neurological Exam  Mental Status  Awake, alert and oriented to person, place and time. Speech is normal. Language is fluent with no aphasia. Attention and concentration are normal.    Cranial Nerves  CN I: Sense of smell is normal.  CN II: Visual acuity is normal.  CN III, IV, VI: Extraocular movements intact bilaterally. Normal lids and orbits bilaterally. Pupils equal round and reactive to light bilaterally.  CN V: Facial sensation is normal.  CN VII: Full and symmetric facial movement.  CN IX, X: Palate elevates symmetrically  CN XI: Shoulder shrug strength is normal.  CN XII: Tongue midline without atrophy or fasciculations.    Motor  Normal muscle bulk throughout. Normal muscle tone.                                               Right                     Left  Toe extension                        5                          5                    "                          Right                     Left  Deltoid                                   5                          5   Biceps                                   5                          5   Triceps                                  5                          5   Wrist extensor                       5                          5   Iliopsoas                               5                          5   Quadriceps                           5                          5   Anterior tibialis                      5                          5   Posterior tibialis                    5                          5    Sensory  Sensation is intact to light touch, pinprick, vibration and proprioception in all four extremities.    Reflexes                                            Right                      Left  Brachioradialis                    2+                         2+  Biceps                                 2+                         2+  Triceps                                2+                         2+  Patellar                                2+                         2+  Achilles                                2+                         2+  Right Plantar: downgoing  Left Plantar: downgoing    Right pathological reflexes: Samantha's absent. Ankle clonus absent.  Left pathological reflexes: Samantha's absent. Ankle clonus absent.    Coordination    Finger-to-nose, rapid alternating movements and heel-to-shin normal bilaterally without dysmetria.    Gait  Casual gait is normal including stance, stride, and arm swing.  (12 bullet pts)    Results Review:   CT Head Without Contrast     Result Date: 8/2/2023  1. No acute intracranial abnormality. Decompressed slitlike frontal horns of the lateral ventricles as well as slitlike appearance of the third ventricle which are decompressed by a right parietal  shunt catheter. With the slitlike appearance, the possibility of \"over shunting\" should be considered.  This " report was finalized on 08/02/2023 20:45 by Dr. All Saab MD.     XR Shunt Series     Result Date: 8/2/2023  The ventriculoperitoneal shunt catheter appears contiguous and intact along its course through the neck, chest and into the abdomen. The tip is seen within the left mid abdomen with several coiled loops. This report was finalized on 08/02/2023 20:23 by Dr. All Saab MD.      8/2/2023.  CT of the head is performed.  No evidence of dilated ventricles.  Shunt in good position.       8/2023.  Shunt series evaluated.  No evidence of breaks.  Pacer noted in the chest.                   Assessment/Plan:   Abundio Smith is a 24 y.o. male with significant medical comorbidities to include seizures, syncope with pacer (since resolved), and shunted hydrocephalus with known prior performance setting at 60. He presents with a new complaint of a persistent frontal headache.  Physical exam findings of neurologically intact with  shunt palpable to the posterior parietal scalp.  Intact to palpation.  I did not evaluate the valves ability to pump or refill due to prior imaging showing slight ventricles.  No recent imaging.    Recommendations:  Known hydrocephalus status post VPS  Prior VPS performance setting at 60  Worsening frontal headaches  For further evaluation we will send in for shunt series x-rays.  I did request that radiology obtain a skull x-ray over the  shunt region to assess placement setting.  Will also send him for CT of the head to rule out ventricular enlargement.  I would like him to return for reassessment with Dr. Tran after all studies been completed.  Both patient and family know they may contact the neurosurgical clinic to return sooner for any new or additional concerns and agrees with this plan of care.    Underweight (BMI < 19.9)   Body mass index is 27.55 kg/m². I recommended Ensure or equivalent nutritional supplement 3 times daily with each meal as a dietary supplement.   Information provided on high-protein diet provided in AVS.    Diagnoses and all orders for this visit:    1. Hydrocephalus, unspecified type (Primary)  -     XR shunt series; Future  -     CT Head Without Contrast; Future    2. Frequent headaches    3. Overweight (BMI 25.0-29.9)      Return for Follow-up with Dr. Tran at next avaliable.    Thank you, for allowing me to continue to participate in the care of this patient.    Sincerely,  JOANA Felix

## 2024-08-28 ENCOUNTER — TELEPHONE (OUTPATIENT)
Dept: NEUROSURGERY | Facility: CLINIC | Age: 24
End: 2024-08-28
Payer: MEDICAID

## 2024-08-28 NOTE — TELEPHONE ENCOUNTER
CALLED TO INFORM PT OF STAT CT APT SCHEDULED FOR 08/29/2024  ARRIVE AT [facility] @ 1:00 FOR A 1:30 CT SCAN    PT IS AWARE OF APT DATE/TIME

## 2024-08-30 ENCOUNTER — HOSPITAL ENCOUNTER (OUTPATIENT)
Dept: CT IMAGING | Facility: HOSPITAL | Age: 24
Discharge: HOME | End: 2024-08-30
Payer: MEDICAID

## 2024-08-30 DIAGNOSIS — G91.9 HYDROCEPHALUS, UNSPECIFIED TYPE: ICD-10-CM

## 2024-08-30 PROCEDURE — 70450 CT HEAD/BRAIN W/O DYE: CPT

## 2024-09-04 ENCOUNTER — TELEPHONE (OUTPATIENT)
Dept: NEUROSURGERY | Facility: CLINIC | Age: 24
End: 2024-09-04
Payer: MEDICAID

## 2024-09-04 DIAGNOSIS — G91.9 HYDROCEPHALUS, UNSPECIFIED TYPE: Primary | ICD-10-CM

## 2024-09-17 ENCOUNTER — TELEPHONE (OUTPATIENT)
Dept: NEUROSURGERY | Facility: CLINIC | Age: 24
End: 2024-09-17
Payer: MEDICAID

## 2024-09-18 ENCOUNTER — HOSPITAL ENCOUNTER (OUTPATIENT)
Dept: NUCLEAR MEDICINE | Facility: HOSPITAL | Age: 24
Discharge: HOME | End: 2024-09-18
Payer: MEDICAID

## 2024-09-18 DIAGNOSIS — G91.9 HYDROCEPHALUS, UNSPECIFIED TYPE: ICD-10-CM

## 2024-09-18 LAB
APPEARANCE CSF: CLEAR
COLOR CSF: COLORLESS
COLOR SPUN CSF: COLORLESS
GLUCOSE CSF-MCNC: 67 MG/DL (ref 40–70)
METHOD: ABNORMAL
NUC CELL # CSF MANUAL: 1 /MM3 (ref 0–5)
PROT CSF-MCNC: 61.9 MG/DL (ref 15–45)
RBC # CSF MANUAL: 2 /MM3 (ref 0–0)
SPECIMEN VOL CSF: 3.8 ML
TUBE # CSF: 3

## 2024-09-18 PROCEDURE — 82945 GLUCOSE OTHER FLUID: CPT | Performed by: NURSE PRACTITIONER

## 2024-09-18 PROCEDURE — 0 TECHNETIUM TC 99M PENTETATE KIT: Performed by: NURSE PRACTITIONER

## 2024-09-18 PROCEDURE — 87205 SMEAR GRAM STAIN: CPT | Performed by: NURSE PRACTITIONER

## 2024-09-18 PROCEDURE — 87070 CULTURE OTHR SPECIMN AEROBIC: CPT | Performed by: NURSE PRACTITIONER

## 2024-09-18 PROCEDURE — A9539 TC99M PENTETATE: HCPCS | Performed by: NURSE PRACTITIONER

## 2024-09-18 PROCEDURE — 89050 BODY FLUID CELL COUNT: CPT | Performed by: NURSE PRACTITIONER

## 2024-09-18 PROCEDURE — 84157 ASSAY OF PROTEIN OTHER: CPT | Performed by: NURSE PRACTITIONER

## 2024-09-18 PROCEDURE — 78645 CSF SHUNT EVALUATION: CPT

## 2024-09-18 PROCEDURE — 87015 SPECIMEN INFECT AGNT CONCNTJ: CPT | Performed by: NURSE PRACTITIONER

## 2024-09-18 RX ADMIN — KIT FOR THE PREPARATION OF TECHNETIUM TC 99M PENTETATE 1 DOSE: 20 INJECTION, POWDER, LYOPHILIZED, FOR SOLUTION INTRAVENOUS; RESPIRATORY (INHALATION) at 07:20

## 2024-09-21 LAB
BACTERIA SPEC AEROBE CULT: NORMAL
GRAM STN SPEC: NORMAL
GRAM STN SPEC: NORMAL

## 2024-09-26 ENCOUNTER — TELEPHONE (OUTPATIENT)
Dept: NEUROSURGERY | Facility: CLINIC | Age: 24
End: 2024-09-26
Payer: MEDICAID

## 2024-09-26 NOTE — TELEPHONE ENCOUNTER
----- Message from Shai Luther sent at 9/26/2024  8:32 AM CDT -----  Regarding: FW: Sarah   Contact: [phone]  Nothing to worry about regarding CSF labs.  No evidence of bacteria growth on spinal fluid culture.  Nuclear medicine shunt study shows a working  shunt.  I do not believe your headaches are in relation to your  shunt.  Would recommend continued evaluation with neurology for treatment of headaches.  Keep scheduled follow-up with Dr. Tran for reevaluation.    Thank you,    JOANA Felix  ----- Message -----  From: Jayde Gonzalez CMA  Sent: 9/24/2024   7:39 AM CDT  To: JOANA Felix  Subject: FW: Sarah                                           ----- Message -----  From: Abundio Smith  Sent: 9/23/2024   6:37 PM CDT  To: [facility]  Subject: Lawrence                                             I just got notification of his csf results, protein,rbc, and wbc is abnormal. Should this be concerning and is there something I need to do.  He is still having headaches daily and not feeling well at all. Please let me know.  Thank you.   Lyudmila Santos   [phone]

## 2024-09-26 NOTE — TELEPHONE ENCOUNTER
Rashmi I have left msg for Mom a msg to call back for these results since I will not be in the office on Friday.

## 2024-09-30 ENCOUNTER — TELEPHONE (OUTPATIENT)
Dept: NEUROLOGY | Facility: CLINIC | Age: 24
End: 2024-09-30
Payer: MEDICAID

## 2024-09-30 NOTE — TELEPHONE ENCOUNTER
Left a voicemail explaining that we are cancelling his appt with Dr. Stockton today; she will not be in the office. I let him know someone will call him back with a new appt date/time.     OK FOR HUB TO RELAY

## 2024-10-12 DIAGNOSIS — G43.719 INTRACTABLE CHRONIC MIGRAINE WITHOUT AURA AND WITHOUT STATUS MIGRAINOSUS: ICD-10-CM

## 2024-10-14 RX ORDER — NORTRIPTYLINE HCL 25 MG
25 CAPSULE ORAL NIGHTLY
Qty: 90 CAPSULE | Refills: 0 | Status: SHIPPED | OUTPATIENT
Start: 2024-10-14

## 2024-11-27 ENCOUNTER — HOSPITAL ENCOUNTER (OUTPATIENT)
Dept: GENERAL RADIOLOGY | Facility: HOSPITAL | Age: 24
Discharge: HOME | End: 2024-11-27
Admitting: NEUROLOGICAL SURGERY
Payer: MEDICAID

## 2024-11-27 ENCOUNTER — OFFICE VISIT (OUTPATIENT)
Dept: NEUROSURGERY | Facility: CLINIC | Age: 24
End: 2024-11-27
Payer: MEDICAID

## 2024-11-27 VITALS — BODY MASS INDEX: 27.63 KG/M2 | HEIGHT: 70 IN | WEIGHT: 193 LBS

## 2024-11-27 DIAGNOSIS — Z98.2 S/P VP SHUNT: ICD-10-CM

## 2024-11-27 DIAGNOSIS — G91.9 HYDROCEPHALUS, UNSPECIFIED TYPE: ICD-10-CM

## 2024-11-27 DIAGNOSIS — Z78.9 NON-SMOKER: ICD-10-CM

## 2024-11-27 DIAGNOSIS — G91.9 HYDROCEPHALUS, UNSPECIFIED TYPE: Primary | ICD-10-CM

## 2024-11-27 DIAGNOSIS — E66.3 OVERWEIGHT WITH BODY MASS INDEX (BMI) OF 27 TO 27.9 IN ADULT: ICD-10-CM

## 2024-11-27 PROCEDURE — 70250 X-RAY EXAM OF SKULL: CPT

## 2024-11-27 NOTE — PROGRESS NOTES
Chief complaint:   No chief complaint on file.    Subjective     HPI:   Abundio Smith is a 24 y.o.  male who presents today with his grandmother for evaluation of worsening headaches with a history of hydrocephalus status post  shunt placement.    Tere initial shunt was placed by Dr. Guerrier at [place] for ventriculomegaly and hydrocephalus.  His shunt was initially revised in 2015 for progressive ventricular dilatation.    The shunt was revised again by Dr. Kirk at [facility] for persistent headaches and syncopal-like events.  He was eventually evaluated by cardiology and has undergone pacemaker placement for episodic syncope that has since resolved.     He is currently under the care of Dr. Sauer for his seizure and migraine headaches.  He is currently taking Depakote and Pamelor.  He has had no seizure like activity in 2+ years.      History of Present Illness  The patient presents for evaluation of headaches. He is accompanied by an adult male.    He reports persistent headaches that have not improved since his last visit. The pain, which he describes as varying in location, occurs daily and is severe enough to disrupt his daily activities, including eating. He notes that the pain sometimes lessens when he lies down. He denies any visual disturbances such as seeing spots. His first shunt was placed by Dr. Guerrier and the most recent one by Dr. Kirk at [facility].         ROS  Review of Systems   Constitutional: Negative.    Eyes: Negative.  Negative for visual disturbance.   Respiratory: Negative.     Cardiovascular: Negative.    Gastrointestinal:  Positive for nausea. Negative for vomiting.   Endocrine: Negative.    Genitourinary: Negative.    Musculoskeletal: Negative.    Skin: Negative.    Allergic/Immunologic: Negative.    Neurological: Negative.  Negative for dizziness, tremors, seizures, syncope, facial asymmetry, speech difficulty, weakness, light-headedness and numbness.    Hematological: Negative.    Psychiatric/Behavioral: Negative.     All other systems reviewed and are negative.    PFSH:  Past Medical History:   Diagnosis Date    Acute otitis media     Backache     Bradycardia     Common cold     Cough     Developmental delay     Dyspnea     Fever     Headache     Hip pain     Joint pain of ankle and foot     Memory impairment     apparent, not definitive       Migraine     S/P ventriculoperitoneal shunt     Seizures     Sprain of ankle     Syncope     Well child visit     immunization update        Past Surgical History:   Procedure Laterality Date    VENTRICULOPERITONEAL SHUNT  2000    Replaced with programable 2022     Objective      Current Outpatient Medications   Medication Sig Dispense Refill    divalproex (DEPAKOTE) 500 MG DR tablet Take 1 tablet by mouth 2 (Two) Times a Day. 180 tablet 3    nortriptyline (PAMELOR) 25 MG capsule TAKE 1 CAPSULE BY MOUTH ONCE DAILY AT NIGHT 90 capsule 0     No current facility-administered medications for this visit.     Vital Signs  There were no vitals taken for this visit.  Physical Exam  Vitals and nursing note reviewed.   Constitutional:       General: He is not in acute distress.     Appearance: Normal appearance. He is well-developed, well-groomed and overweight. He is not ill-appearing, toxic-appearing or diaphoretic.      Comments: BMI 27.55   HENT:      Head: Normocephalic and atraumatic.        Right Ear: Hearing normal.      Left Ear: Hearing normal.   Eyes:      General: Lids are normal.      Extraocular Movements: Extraocular movements intact.      Conjunctiva/sclera: Conjunctivae normal.      Pupils: Pupils are equal, round, and reactive to light.   Neck:      Trachea: Trachea normal.   Cardiovascular:      Rate and Rhythm: Normal rate and regular rhythm.   Pulmonary:      Effort: Pulmonary effort is normal. No tachypnea, bradypnea, accessory muscle usage or respiratory distress.   Abdominal:      Palpations: Abdomen is soft.    Musculoskeletal:      Cervical back: Full passive range of motion without pain and neck supple.   Skin:     General: Skin is warm and dry.   Neurological:      GCS: GCS eye subscore is 4. GCS verbal subscore is 5. GCS motor subscore is 6.      Coordination: Coordination is intact.      Deep Tendon Reflexes:      Reflex Scores:       Tricep reflexes are 2+ on the right side and 2+ on the left side.       Bicep reflexes are 2+ on the right side and 2+ on the left side.       Brachioradialis reflexes are 2+ on the right side and 2+ on the left side.       Patellar reflexes are 2+ on the right side and 2+ on the left side.       Achilles reflexes are 2+ on the right side and 2+ on the left side.  Psychiatric:         Speech: Speech normal.         Behavior: Behavior normal. Behavior is cooperative.       Neurological Exam  Mental Status  Awake, alert and oriented to person, place and time. Speech is normal. Language is fluent with no aphasia. Attention and concentration are normal.    Cranial Nerves  CN I: Sense of smell is normal.  CN II: Visual acuity is normal.  CN III, IV, VI: Extraocular movements intact bilaterally. Normal lids and orbits bilaterally. Pupils equal round and reactive to light bilaterally.  CN V: Facial sensation is normal.  CN VII: Full and symmetric facial movement.  CN IX, X: Palate elevates symmetrically  CN XI: Shoulder shrug strength is normal.  CN XII: Tongue midline without atrophy or fasciculations.    Motor  Normal muscle bulk throughout. Normal muscle tone.                                               Right                     Left  Toe extension                        5                          5                                             Right                     Left  Deltoid                                   5                          5   Biceps                                   5                          5   Triceps                                  5                          5  "  Wrist extensor                       5                          5   Iliopsoas                               5                          5   Quadriceps                           5                          5   Anterior tibialis                      5                          5   Posterior tibialis                    5                          5    Sensory  Sensation is intact to light touch, pinprick, vibration and proprioception in all four extremities.    Reflexes                                            Right                      Left  Brachioradialis                    2+                         2+  Biceps                                 2+                         2+  Triceps                                2+                         2+  Patellar                                2+                         2+  Achilles                                2+                         2+  Right Plantar: downgoing  Left Plantar: downgoing    Right pathological reflexes: Samantha's absent. Ankle clonus absent.  Left pathological reflexes: Samantha's absent. Ankle clonus absent.    Coordination    Finger-to-nose, rapid alternating movements and heel-to-shin normal bilaterally without dysmetria.    Gait  Casual gait is normal including stance, stride, and arm swing.  (12 bullet pts)    Results Review:   CT Head Without Contrast     Result Date: 8/2/2023  1. No acute intracranial abnormality. Decompressed slitlike frontal horns of the lateral ventricles as well as slitlike appearance of the third ventricle which are decompressed by a right parietal  shunt catheter. With the slitlike appearance, the possibility of \"over shunting\" should be considered.  This report was finalized on 08/02/2023 20:45 by Dr. All Saab MD.     XR Shunt Series     Result Date: 8/2/2023  The ventriculoperitoneal shunt catheter appears contiguous and intact along its course through the neck, chest and into the abdomen. The tip is seen within the " left mid abdomen with several coiled loops. This report was finalized on 08/02/2023 20:23 by Dr. All Saab MD.      8/2/2023.  CT of the head is performed.  No evidence of dilated ventricles.  Shunt in good position.       8/2023.  Shunt series evaluated.  No evidence of breaks.  Pacer noted in the chest.           11/27/2024            Shunt study independently reviewed and found to have flow into the distal abdomen.  Previous skull x-rays reviewed showing a shunt set to 70.        Assessment/Plan:   Abundio Smith is a 24 y.o. male with significant medical comorbidities to include seizures, syncope with pacer (since resolved), and shunted hydrocephalus with known prior performance setting at 60. He presents with a new complaint of a persistent frontal headache.  Physical exam findings of neurologically intact with  shunt palpable to the posterior parietal scalp.  Intact to palpation.  I did not evaluate the valves ability to pump or refill due to prior imaging showing slight ventricles.  No recent imaging.    Recommendations:  Known hydrocephalus status post VPS  Prior VPS performance setting at 70  Worsening frontal headaches  Assessment & Plan  1. Headaches, possibly low pressure headaches.  The patient's headaches are likely due to low pressure, as evidenced by the shunt study confirming a working shunt. The current shunt setting is at 70, which is relatively low. The shunt will be reprogrammed to a higher setting of 140 to potentially alleviate the headaches. A skull x-ray will be ordered to ensure the shunt is in the correct position. He is advised to monitor his headaches daily and rate them on a scale of 1 to 10. If there is no improvement in the headaches, the shunt setting can be increased up to 200.    Follow-up  Patient is scheduled for a follow-up visit in 1 month.    SHUNT ADJUSTMENT PROCEDURE:  Mr. Smith  shunt is palpable to right posteroauricular scalp.  Shunt pumps and fills well.   This  shunt adjustment was performed using the [employer] Valve adjustment tool.  The  tool was placed above the valve with a opening encompassing the valve mechanism and the blue arrow pointing towards the distal tubing.  The current performance level setting was confirmed at 70 by xrays.  Utilizing the [employer] reprogram her the shunt was then reprogrammed to 140.  The patient will be sent for post programming x-rays.  Addendum: Post programming skull x-ray confirms shunt set at 140.      Underweight (BMI < 19.9)   There is no height or weight on file to calculate BMI. I recommended Ensure or equivalent nutritional supplement 3 times daily with each meal as a dietary supplement.  Information provided on high-protein diet provided in AVS.    There are no diagnoses linked to this encounter.    No follow-ups on file.    Thank you, for allowing me to continue to participate in the care of this patient.    I spent 15 minutes caring for Abundio on this date of service. This time includes time spent by me in the following activities: preparing for the visit, reviewing tests, obtaining and/or reviewing a separately obtained history, performing a medically appropriate examination and/or evaluation, counseling and educating the patient/family/caregiver, ordering medications, tests, or procedures, referring and communicating with other health care professionals, documenting information in the medical record, independently interpreting results and communicating that information with the patient/family/caregiver, and/or care coordination.     Medical Decision Making (2/3)  Problem Points (2,3,4 or more)  Undiagnosed new problem (Mod)  Data Points (2,3,4 or more)  CATEGORY 1  INDEPENDENT INTERPRETATION Imaging = 2  ORDERED: Imaging (X-ray,CT, MRI) = 1.  CATEGORY 2  Independent interpretation of test performed by another physician/NPP (Cat 2)  CATEGORY 3  Risk (Low, Mod, High)  LOW    E/M = MDM 2 out of 3   or  Time  Est Level  4 - 52393 = Mod + (Cat1=3pts or Cat2 or Cat3) + Mod Risk   or   45-59 min      Sincerely,  Tawanda Tran MD

## 2024-12-19 ENCOUNTER — OFFICE VISIT (OUTPATIENT)
Dept: NEUROSURGERY | Facility: CLINIC | Age: 24
End: 2024-12-19
Payer: MEDICAID

## 2024-12-19 VITALS — HEIGHT: 72 IN | BODY MASS INDEX: 26.55 KG/M2 | WEIGHT: 196 LBS

## 2024-12-19 DIAGNOSIS — E66.3 OVERWEIGHT (BMI 25.0-29.9): ICD-10-CM

## 2024-12-19 DIAGNOSIS — G91.9 HYDROCEPHALUS, UNSPECIFIED TYPE: Primary | ICD-10-CM

## 2024-12-19 DIAGNOSIS — Z98.2 S/P VP SHUNT: ICD-10-CM

## 2024-12-19 DIAGNOSIS — R51.9 FREQUENT HEADACHES: ICD-10-CM

## 2024-12-19 NOTE — PROGRESS NOTES
Chief complaint:   Chief Complaint   Patient presents with    Hydrocephalus     Pt is here for followup on his headaches.     Reports they are much better.       Subjective     HPI:   Abundio Smith is a 24 y.o.  male who presents today for continued evaluation of worsening headaches with a history of hydrocephalus status post  shunt placement.     Tere initial shunt was placed by Dr. Guerrier at [place] for ventriculomegaly and hydrocephalus.  His shunt was initially revised in 2015 for progressive ventricular dilatation.     The shunt was revised again by Dr. Kirk at [facility] for persistent headaches and syncopal-like events.  He was eventually evaluated by cardiology and has undergone pacemaker placement for episodic syncope that has since resolved.     He is currently under the care of Dr. Sauer for his seizure and migraine headaches.  He is currently taking Depakote and Pamelor.  He has had no seizure like activity in 2+ years.      At his last encounter om 11/27/2024 was adjusted from /140.     History of Present Illness  Abundio has done extremely well since he was last evaluated.  He reports a general improvement in his condition, experienced only 2 episodes of headaches since the adjustment of his shunt.  He has not experiencing any other symptoms such as nausea, vomiting, or dizziness.  He currently rates the severity of his symptoms 0/10.      ROS  Review of Systems   Constitutional: Negative.    HENT: Negative.     Eyes: Negative.    Respiratory: Negative.     Cardiovascular: Negative.    Gastrointestinal: Negative.    Endocrine: Negative.    Genitourinary: Negative.    Musculoskeletal: Negative.    Skin: Negative.    Allergic/Immunologic: Negative.    Neurological: Negative.    Hematological: Negative.    Psychiatric/Behavioral: Negative.     All other systems reviewed and are negative.    PFSH:  Past Medical History:   Diagnosis Date    Abnormal ECG     Acute  "otitis media     Backache     Bradycardia     Common cold     Cough     Developmental delay     Dyspnea     Fever     Headache     Hip pain     Joint pain of ankle and foot     Memory impairment     apparent, not definitive       Migraine     S/P ventriculoperitoneal shunt     Seizures     Sprain of ankle     Syncope     Well child visit     immunization update        Past Surgical History:   Procedure Laterality Date    VENTRICULOPERITONEAL SHUNT  2000    Replaced with programable 2022     Objective      Current Outpatient Medications   Medication Sig Dispense Refill    divalproex (DEPAKOTE) 500 MG DR tablet Take 1 tablet by mouth 2 (Two) Times a Day. 180 tablet 3    nortriptyline (PAMELOR) 25 MG capsule TAKE 1 CAPSULE BY MOUTH ONCE DAILY AT NIGHT 90 capsule 0    brompheniramine-pseudoephedrine-DM 30-2-10 MG/5ML syrup Take 5 mL by mouth 4 (Four) Times a Day As Needed for Congestion or Cough. 118 mL 0    fluticasone (FLONASE) 50 MCG/ACT nasal spray Administer 2 sprays into the nostril(s) as directed by provider Daily. 2 puffs each nostril 16 g 0    methylPREDNISolone (MEDROL) 4 MG dose pack Take as directed on package instructions. 21 tablet 0     No current facility-administered medications for this visit.     Vital Signs  Ht 182 cm (71.65\")   Wt 88.9 kg (196 lb)   BMI 26.84 kg/m²   Physical Exam  Vitals and nursing note reviewed.   Constitutional:       General: He is not in acute distress.     Appearance: Normal appearance. He is well-developed, well-groomed and overweight. He is not ill-appearing, toxic-appearing or diaphoretic.      Comments: BMI 26.84   HENT:      Head: Normocephalic and atraumatic.      Right Ear: Hearing normal.      Left Ear: Hearing normal.   Eyes:      General: Lids are normal.      Extraocular Movements: Extraocular movements intact.      Conjunctiva/sclera: Conjunctivae normal.      Pupils: Pupils are equal, round, and reactive to light.   Neck:      Trachea: Trachea normal. "   Cardiovascular:      Rate and Rhythm: Normal rate and regular rhythm.   Pulmonary:      Effort: Pulmonary effort is normal. No tachypnea, bradypnea, accessory muscle usage or respiratory distress.   Abdominal:      Palpations: Abdomen is soft.   Musculoskeletal:      Cervical back: Full passive range of motion without pain and neck supple.   Skin:     General: Skin is warm and dry.   Neurological:      GCS: GCS eye subscore is 4. GCS verbal subscore is 5. GCS motor subscore is 6.      Coordination: Coordination is intact.      Deep Tendon Reflexes:      Reflex Scores:       Tricep reflexes are 2+ on the right side and 2+ on the left side.       Bicep reflexes are 2+ on the right side and 2+ on the left side.       Brachioradialis reflexes are 2+ on the right side and 2+ on the left side.       Patellar reflexes are 2+ on the right side and 2+ on the left side.       Achilles reflexes are 2+ on the right side and 2+ on the left side.  Psychiatric:         Speech: Speech normal.         Behavior: Behavior normal. Behavior is cooperative.       Neurological Exam  Mental Status  Awake, alert and oriented to person, place and time. Speech is normal. Language is fluent with no aphasia. Attention and concentration are normal.    Cranial Nerves  CN I: Sense of smell is normal.  CN II: Visual acuity is normal.  CN III, IV, VI: Extraocular movements intact bilaterally. Normal lids and orbits bilaterally. Pupils equal round and reactive to light bilaterally.  CN V: Facial sensation is normal.  CN VII: Full and symmetric facial movement.  CN IX, X: Palate elevates symmetrically  CN XI: Shoulder shrug strength is normal.  CN XII: Tongue midline without atrophy or fasciculations.    Motor  Normal muscle bulk throughout. Normal muscle tone.                                               Right                     Left  Toe extension                        5                          5                                              "Right                     Left  Deltoid                                   5                          5   Biceps                                   5                          5   Triceps                                  5                          5   Wrist extensor                       5                          5   Iliopsoas                               5                          5   Quadriceps                           5                          5   Anterior tibialis                      5                          5   Posterior tibialis                    5                          5    Sensory  Sensation is intact to light touch, pinprick, vibration and proprioception in all four extremities.    Reflexes                                            Right                      Left  Brachioradialis                    2+                         2+  Biceps                                 2+                         2+  Triceps                                2+                         2+  Patellar                                2+                         2+  Achilles                                2+                         2+  Right Plantar: downgoing  Left Plantar: downgoing    Right pathological reflexes: Samantha's absent. Ankle clonus absent.  Left pathological reflexes: Samantha's absent. Ankle clonus absent.    Coordination    Finger-to-nose, rapid alternating movements and heel-to-shin normal bilaterally without dysmetria.    Gait  Casual gait is normal including stance, stride, and arm swing.  (12 bullet pts)    Results Review:   CT Head Without Contrast     Result Date: 8/2/2023  1. No acute intracranial abnormality. Decompressed slitlike frontal horns of the lateral ventricles as well as slitlike appearance of the third ventricle which are decompressed by a right parietal  shunt catheter. With the slitlike appearance, the possibility of \"over shunting\" should be considered.  This report was finalized on " 08/02/2023 20:45 by Dr. All Saab MD.     XR Shunt Series     Result Date: 8/2/2023  The ventriculoperitoneal shunt catheter appears contiguous and intact along its course through the neck, chest and into the abdomen. The tip is seen within the left mid abdomen with several coiled loops. This report was finalized on 08/02/2023 20:23 by Dr. All Saab MD.      8/2/2023.  CT of the head is performed.  No evidence of dilated ventricles.  Shunt in good position.       8/2023.  Shunt series evaluated.  No evidence of breaks.  Pacer noted in the chest.       8/2023.  Performance setting at 70      9/18/2024    Shunt study independently reviewed and found to have flow into the distal abdomen.  Previous skull x-rays reviewed showing a shunt set to 70.     11/27/2024.  Performance setting standing 130-1 40           Assessment/Plan:   Abundio Smith is a 24 y.o. male with significant medical comorbidities to include seizures, syncope with pacer (since resolved), and shunted hydrocephalus, performance setting adjusted from /140 on 11/27/2024. He presents today, more or less asymptomatic, for continued evaluation of persistent headaches.  Physical exam findings of neurologically intact with  shunt palpable to the posterior parietal scalp.  Intact to palpation.  Skull x-ray confirms  shunt reprogramming at 130-140.    Recommendations:  Known hydrocephalus status post VPS  Prior VPS performance setting at 130-140  Worsening frontal headaches, resolved  Abundio has done extremely well since we last saw him.  His headaches have more or less resolved, reporting only 2 incidents of headache since his shunt was reprogrammed.  He continues to deny  shunt failure symptoms to include, but not limited to nausea, vomiting, or dizziness.  He has returned to work without complication.  No additional imaging needed at this time.  We will have Abundio return for reevaluation in 1 year.  Both patient and family know  they may contact neurosurgical clinic to return sooner for any new or additional concerns and agrees with this plan of care.    Overweight (BMI 25.0-29.9)  Body mass index is 26.84 kg/m²..  Information on the DASH diet provided in the AVS.  We will continue to provided diet and exercise information with the goal of weight loss at each scheduled appointment.      Diagnoses and all orders for this visit:    1. Hydrocephalus, unspecified type (Primary)    2. S/P  shunt    3. Frequent headaches  Comments:  Currently resolved    4. Overweight (BMI 25.0-29.9)      Return for FOLLOWUP IN 1 YR WITH DR. POP..    Thank you, for allowing me to continue to participate in the care of this patient.    Sincerely,  JOANA Felix    Patient or patient representative verbalized consent for the use of Ambient Listening during the visit with  JOANA Felix for chart documentation. 12/20/2024  09:26 CST

## 2024-12-19 NOTE — PATIENT INSTRUCTIONS
"DASH Eating Plan  DASH stands for Dietary Approaches to Stop Hypertension. The DASH eating plan is a healthy eating plan that has been shown to:  Lower high blood pressure (hypertension).  Reduce your risk for type 2 diabetes, heart disease, and stroke.  Help with weight loss.  What are tips for following this plan?  Reading food labels  Check food labels for the amount of salt (sodium) per serving. Choose foods with less than 5 percent of the Daily Value (DV) of sodium. In general, foods with less than 300 milligrams (mg) of sodium per serving fit into this eating plan.  To find whole grains, look for the word \"whole\" as the first word in the ingredient list.  Shopping  Buy products labeled as \"low-sodium\" or \"no salt added.\"  Buy fresh foods. Avoid canned foods and pre-made or frozen meals.  Cooking  Try not to add salt when you cook. Use salt-free seasonings or herbs instead of table salt or sea salt. Check with your health care provider or pharmacist before using salt substitutes.  Do not frias foods. Cook foods in healthy ways, such as baking, boiling, grilling, roasting, or broiling.  Cook using oils that are good for your heart. These include olive, canola, avocado, soybean, and sunflower oil.  Meal planning    Eat a balanced diet. This should include:  4 or more servings of fruits and 4 or more servings of vegetables each day. Try to fill half of your plate with fruits and vegetables.  6-8 servings of whole grains each day.  6 or less servings of lean meat, poultry, or fish each day. 1 oz is 1 serving. A 3 oz (85 g) serving of meat is about the same size as the palm of your hand. One egg is 1 oz (28 g).  2-3 servings of low-fat dairy each day. One serving is 1 cup (237 mL).  1 serving of nuts, seeds, or beans 5 times each week.  2-3 servings of heart-healthy fats. Healthy fats called omega-3 fatty acids are found in foods such as walnuts, flaxseeds, fortified milks, and eggs. These fats are also found in " cold-water fish, such as sardines, salmon, and mackerel.  Limit how much you eat of:  Canned or prepackaged foods.  Food that is high in trans fat, such as fried foods.  Food that is high in saturated fat, such as fatty meat.  Desserts and other sweets, sugary drinks, and other foods with added sugar.  Full-fat dairy products.  Do not salt foods before eating.  Do not eat more than 4 egg yolks a week.  Try to eat at least 2 vegetarian meals a week.  Eat more home-cooked food and less restaurant, buffet, and fast food.  Lifestyle  When eating at a restaurant, ask if your food can be made with less salt or no salt.  If you drink alcohol:  Limit how much you have to:  0-1 drink a day if you are female.  0-2 drinks a day if you are male.  Know how much alcohol is in your drink. In the [place] one drink is one 12 oz bottle of beer (355 mL), one 5 oz glass of wine (148 mL), or one 1½ oz glass of hard liquor (44 mL).  General information  Avoid eating more than 2,300 mg of salt a day. If you have hypertension, you may need to reduce your sodium intake to 1,500 mg a day.  Work with your provider to stay at a healthy body weight or lose weight. Ask what the best weight range is for you.  On most days of the week, get at least 30 minutes of exercise that causes your heart to beat faster. This may include walking, swimming, or biking.  Work with your provider or dietitian to adjust your eating plan to meet your specific calorie needs.  What foods should I eat?  Fruits  All fresh, dried, or frozen fruit. Canned fruits that are in their natural juice and do not have sugar added to them.  Vegetables  Fresh or frozen vegetables that are raw, steamed, roasted, or grilled. Low-sodium or reduced-sodium tomato and vegetable juice. Low-sodium or reduced-sodium tomato sauce and tomato paste. Low-sodium or reduced-sodium canned vegetables.  Grains  Whole-grain or whole-wheat bread. Whole-grain or whole-wheat pasta. Brown rice. Oatmeal.  Quinoa. Bulgur. Whole-grain and low-sodium cereals. Neda bread. Low-fat, low-sodium crackers. Whole-wheat flour tortillas.  Meats and other proteins  Skinless chicken or turkey. Ground chicken or turkey. Pork with fat trimmed off. Fish and seafood. Egg whites. Dried beans, peas, or lentils. Unsalted nuts, nut butters, and seeds. Unsalted canned beans. Lean cuts of beef with fat trimmed off. Low-sodium, lean precooked or cured meat, such as sausages or meat loaves.  Dairy  Low-fat (1%) or fat-free (skim) milk. Reduced-fat, low-fat, or fat-free cheeses. Nonfat, low-sodium ricotta or cottage cheese. Low-fat or nonfat yogurt. Low-fat, low-sodium cheese.  Fats and oils  Soft margarine without trans fats. Vegetable oil. Reduced-fat, low-fat, or light mayonnaise and salad dressings (reduced-sodium). Canola, safflower, olive, avocado, soybean, and sunflower oils. Avocado.  Seasonings and condiments  Herbs. Spices. Seasoning mixes without salt.  Other foods  Unsalted popcorn and pretzels. Fat-free sweets.  The items listed above may not be all the foods and drinks you can have. Talk to a dietitian to learn more.  What foods should I avoid?  Fruits  Canned fruit in a light or heavy syrup. Fried fruit. Fruit in cream or butter sauce.  Vegetables  Creamed or fried vegetables. Vegetables in a cheese sauce. Regular canned vegetables that are not marked as low-sodium or reduced-sodium. Regular canned tomato sauce and paste that are not marked as low-sodium or reduced-sodium. Regular tomato and vegetable juices that are not marked as low-sodium or reduced-sodium. Pickles. Olives.  Grains  Baked goods made with fat, such as croissants, muffins, or some breads. Dry pasta or rice meal packs.  Meats and other proteins  Fatty cuts of meat. Ribs. Fried meat. Azar. Bologna, salami, and other precooked or cured meats, such as sausages or meat loaves, that are not lean and low in sodium. Fat from the back of a pig (fatback). Lucrecia.  Salted nuts and seeds. Canned beans with added salt. Canned or smoked fish. Whole eggs or egg yolks. Chicken or turkey with skin.  Dairy  Whole or 2% milk, cream, and half-and-half. Whole or full-fat cream cheese. Whole-fat or sweetened yogurt. Full-fat cheese. Nondairy creamers. Whipped toppings. Processed cheese and cheese spreads.  Fats and oils  Butter. Stick margarine. Lard. Shortening. Ghee. Azar fat. Tropical oils, such as coconut, palm kernel, or palm oil.  Seasonings and condiments  Onion salt, garlic salt, seasoned salt, table salt, and sea salt. Worcestershire sauce. Tartar sauce. Barbecue sauce. Teriyaki sauce. Soy sauce, including reduced-sodium soy sauce. Steak sauce. Canned and packaged gravies. Fish sauce. Oyster sauce. Cocktail sauce. Store-bought horseradish. Ketchup. Mustard. Meat flavorings and tenderizers. Bouillon cubes. Hot sauces. Pre-made or packaged marinades. Pre-made or packaged taco seasonings. Relishes. Regular salad dressings.  Other foods  Salted popcorn and pretzels.  The items listed above may not be all the foods and drinks you should avoid. Talk to a dietitian to learn more.  Where to find more information  National Heart, Lung, and Blood Rex (NHLBI): nhlbi.nih.gov  American Heart Association (AHA): heart.org  Academy of Nutrition and Dietetics: eatright.org  National Kidney Foundation (NKF): kidney.org  This information is not intended to replace advice given to you by your health care provider. Make sure you discuss any questions you have with your health care provider.  Document Revised: 01/04/2024 Document Reviewed: 01/04/2024  [employer]vier Patient Education © 2024 [employer]

## 2025-01-13 ENCOUNTER — TELEPHONE (OUTPATIENT)
Dept: NEUROLOGY | Facility: CLINIC | Age: 25
End: 2025-01-13

## 2025-01-13 NOTE — TELEPHONE ENCOUNTER
Spoke to pt regarding appt cx for today. He stated that he was doing well at this time and will look for his next appt in [facility].